# Patient Record
Sex: MALE | Race: WHITE | Employment: FULL TIME | URBAN - METROPOLITAN AREA
[De-identification: names, ages, dates, MRNs, and addresses within clinical notes are randomized per-mention and may not be internally consistent; named-entity substitution may affect disease eponyms.]

---

## 2018-06-18 ENCOUNTER — OFFICE VISIT (OUTPATIENT)
Dept: FAMILY MEDICINE CLINIC | Age: 23
End: 2018-06-18
Payer: COMMERCIAL

## 2018-06-18 VITALS
SYSTOLIC BLOOD PRESSURE: 124 MMHG | TEMPERATURE: 97.5 F | WEIGHT: 189.4 LBS | BODY MASS INDEX: 25.65 KG/M2 | RESPIRATION RATE: 16 BRPM | HEIGHT: 72 IN | HEART RATE: 72 BPM | DIASTOLIC BLOOD PRESSURE: 72 MMHG

## 2018-06-18 DIAGNOSIS — Z00.00 HEALTHCARE MAINTENANCE: Primary | ICD-10-CM

## 2018-06-18 DIAGNOSIS — K21.9 GASTROESOPHAGEAL REFLUX DISEASE, ESOPHAGITIS PRESENCE NOT SPECIFIED: ICD-10-CM

## 2018-06-18 DIAGNOSIS — Z00.00 HEALTHCARE MAINTENANCE: ICD-10-CM

## 2018-06-18 LAB
ALBUMIN SERPL-MCNC: 4.5 G/DL (ref 3.9–4.9)
ALP BLD-CCNC: 53 U/L (ref 35–104)
ALT SERPL-CCNC: 19 U/L (ref 0–41)
ANION GAP SERPL CALCULATED.3IONS-SCNC: 14 MEQ/L (ref 7–13)
AST SERPL-CCNC: 20 U/L (ref 0–40)
BASOPHILS ABSOLUTE: 0.1 K/UL (ref 0–0.2)
BASOPHILS RELATIVE PERCENT: 0.9 %
BILIRUB SERPL-MCNC: 0.4 MG/DL (ref 0–1.2)
BUN BLDV-MCNC: 15 MG/DL (ref 6–20)
CALCIUM SERPL-MCNC: 9.4 MG/DL (ref 8.6–10.2)
CHLORIDE BLD-SCNC: 101 MEQ/L (ref 98–107)
CO2: 26 MEQ/L (ref 22–29)
CREAT SERPL-MCNC: 0.64 MG/DL (ref 0.7–1.2)
EOSINOPHILS ABSOLUTE: 0.1 K/UL (ref 0–0.7)
EOSINOPHILS RELATIVE PERCENT: 1.8 %
GFR AFRICAN AMERICAN: >60
GFR NON-AFRICAN AMERICAN: >60
GLOBULIN: 2.7 G/DL (ref 2.3–3.5)
GLUCOSE BLD-MCNC: 107 MG/DL (ref 74–109)
HCT VFR BLD CALC: 41.5 % (ref 42–52)
HEMOGLOBIN: 14.1 G/DL (ref 14–18)
LYMPHOCYTES ABSOLUTE: 2.3 K/UL (ref 1–4.8)
LYMPHOCYTES RELATIVE PERCENT: 36.8 %
MCH RBC QN AUTO: 29.8 PG (ref 27–31.3)
MCHC RBC AUTO-ENTMCNC: 34 % (ref 33–37)
MCV RBC AUTO: 87.9 FL (ref 80–100)
MONOCYTES ABSOLUTE: 0.6 K/UL (ref 0.2–0.8)
MONOCYTES RELATIVE PERCENT: 9.4 %
NEUTROPHILS ABSOLUTE: 3.1 K/UL (ref 1.4–6.5)
NEUTROPHILS RELATIVE PERCENT: 51.1 %
PDW BLD-RTO: 12.5 % (ref 11.5–14.5)
PLATELET # BLD: 235 K/UL (ref 130–400)
POTASSIUM SERPL-SCNC: 4.2 MEQ/L (ref 3.5–5.1)
RBC # BLD: 4.72 M/UL (ref 4.7–6.1)
SODIUM BLD-SCNC: 141 MEQ/L (ref 132–144)
TOTAL PROTEIN: 7.2 G/DL (ref 6.4–8.1)
WBC # BLD: 6.1 K/UL (ref 4.8–10.8)

## 2018-06-18 PROCEDURE — 99395 PREV VISIT EST AGE 18-39: CPT | Performed by: FAMILY MEDICINE

## 2018-06-18 RX ORDER — OMEPRAZOLE 20 MG/1
20 CAPSULE, DELAYED RELEASE ORAL DAILY
Qty: 30 CAPSULE | Refills: 1 | Status: SHIPPED | OUTPATIENT
Start: 2018-06-18 | End: 2018-08-24 | Stop reason: SDUPTHER

## 2018-06-18 RX ORDER — OMEPRAZOLE 20 MG/1
20 CAPSULE, DELAYED RELEASE ORAL DAILY
Qty: 90 CAPSULE | Refills: 3 | OUTPATIENT
Start: 2018-06-18

## 2018-06-18 RX ORDER — OMEPRAZOLE 20 MG/1
20 CAPSULE, DELAYED RELEASE ORAL 2 TIMES DAILY
Qty: 30 CAPSULE | Refills: 2 | Status: SHIPPED | OUTPATIENT
Start: 2018-06-18 | End: 2018-06-18 | Stop reason: SDUPTHER

## 2018-06-18 RX ORDER — CALCIUM CARBONATE 200(500)MG
2 TABLET,CHEWABLE ORAL PRN
COMMUNITY
End: 2018-08-24

## 2018-06-18 ASSESSMENT — PATIENT HEALTH QUESTIONNAIRE - PHQ9
SUM OF ALL RESPONSES TO PHQ9 QUESTIONS 1 & 2: 0
1. LITTLE INTEREST OR PLEASURE IN DOING THINGS: 0
SUM OF ALL RESPONSES TO PHQ QUESTIONS 1-9: 0
2. FEELING DOWN, DEPRESSED OR HOPELESS: 0

## 2018-08-24 ENCOUNTER — OFFICE VISIT (OUTPATIENT)
Dept: FAMILY MEDICINE CLINIC | Age: 23
End: 2018-08-24
Payer: COMMERCIAL

## 2018-08-24 VITALS
SYSTOLIC BLOOD PRESSURE: 104 MMHG | TEMPERATURE: 97.4 F | RESPIRATION RATE: 12 BRPM | HEIGHT: 72 IN | BODY MASS INDEX: 25.33 KG/M2 | HEART RATE: 60 BPM | WEIGHT: 187 LBS | DIASTOLIC BLOOD PRESSURE: 62 MMHG

## 2018-08-24 DIAGNOSIS — K21.9 GASTROESOPHAGEAL REFLUX DISEASE, ESOPHAGITIS PRESENCE NOT SPECIFIED: ICD-10-CM

## 2018-08-24 DIAGNOSIS — J01.90 ACUTE BACTERIAL SINUSITIS: Primary | ICD-10-CM

## 2018-08-24 DIAGNOSIS — B96.89 ACUTE BACTERIAL SINUSITIS: Primary | ICD-10-CM

## 2018-08-24 PROCEDURE — G8419 CALC BMI OUT NRM PARAM NOF/U: HCPCS | Performed by: FAMILY MEDICINE

## 2018-08-24 PROCEDURE — 1036F TOBACCO NON-USER: CPT | Performed by: FAMILY MEDICINE

## 2018-08-24 PROCEDURE — 99213 OFFICE O/P EST LOW 20 MIN: CPT | Performed by: FAMILY MEDICINE

## 2018-08-24 PROCEDURE — G8427 DOCREV CUR MEDS BY ELIG CLIN: HCPCS | Performed by: FAMILY MEDICINE

## 2018-08-24 RX ORDER — FLUTICASONE PROPIONATE 50 MCG
SPRAY, SUSPENSION (ML) NASAL
Qty: 1 BOTTLE | Refills: 2 | Status: SHIPPED | OUTPATIENT
Start: 2018-08-24 | End: 2018-10-17 | Stop reason: ALTCHOICE

## 2018-08-24 RX ORDER — BENZONATATE 200 MG/1
200 CAPSULE ORAL 3 TIMES DAILY PRN
Qty: 30 CAPSULE | Refills: 0 | Status: SHIPPED | OUTPATIENT
Start: 2018-08-24 | End: 2018-10-17 | Stop reason: ALTCHOICE

## 2018-08-24 RX ORDER — OMEPRAZOLE 20 MG/1
20 CAPSULE, DELAYED RELEASE ORAL DAILY
Qty: 30 CAPSULE | Refills: 5 | Status: SHIPPED | OUTPATIENT
Start: 2018-08-24 | End: 2018-08-24 | Stop reason: SDUPTHER

## 2018-08-24 RX ORDER — AZITHROMYCIN 250 MG/1
TABLET, FILM COATED ORAL
Qty: 1 PACKET | Refills: 0 | Status: SHIPPED | OUTPATIENT
Start: 2018-08-24 | End: 2018-10-17 | Stop reason: ALTCHOICE

## 2018-08-24 RX ORDER — OMEPRAZOLE 20 MG/1
20 CAPSULE, DELAYED RELEASE ORAL DAILY
Qty: 90 CAPSULE | Refills: 1 | Status: SHIPPED | OUTPATIENT
Start: 2018-08-24 | End: 2019-03-16 | Stop reason: SDUPTHER

## 2018-08-24 NOTE — PROGRESS NOTES
Chief Complaint   Patient presents with    Follow-up     GERD    URI     cough, congestion, sore throat, headache       Patient presents today to follow up on GERD. He has been doing well on the omeprazole which she takes only every other day. He has been tolerating the medication with no side effects. His reflux symptoms has resolved. He denies heartburn no dysphagia and no odynophagia. He has no diarrhea and no other side effects from the omeprazole. Thomas Page is a 25 y.o. male who presents for evaluation of symptoms of a URI, possible sinusitis. Symptoms include congestion, headache described as frontal, nasal congestion, post nasal drip, productive cough with  yellow and green colored sputum and sore throat. Onset of symptoms was 47days ago, unchanged since that time. Treatment to date: ibuprofen  . Patient Active Problem List   Diagnosis    Gastroesophageal reflux disease       Current Outpatient Prescriptions   Medication Sig Dispense Refill    azithromycin (ZITHROMAX) 250 MG tablet Take 2 tabs (500 mg) on Day 1, and take 1 tab (250 mg) on days 2 through 5. 1 packet 0    benzonatate (TESSALON) 200 MG capsule Take 1 capsule by mouth 3 times daily as needed for Cough 30 capsule 0    fluticasone (FLONASE) 50 MCG/ACT nasal spray 2 sprays each nostril daily 1 Bottle 2    omeprazole (PRILOSEC) 20 MG delayed release capsule Take 1 capsule by mouth Daily 90 capsule 1     No current facility-administered medications for this visit.         Review of Systems   General ROS: negative for - chills, fever, malaise or night sweats   Respiratory ROS: negative for - hemoptysis, pleuritic pain, shortness of breath or wheezing  Cardiovascular ROS: negative for - chest pain, dyspnea on exertion, edema, loss of consciousness, orthopnea, palpitations or paroxysmal nocturnal dyspnea   Gastrointestinal ROS: negative for - abdominal pain, blood in stools, change in bowel habits, constipation, diarrhea,

## 2018-08-24 NOTE — PATIENT INSTRUCTIONS
Patient Education        Sinusitis: Care Instructions  Your Care Instructions    Sinusitis is an infection of the lining of the sinus cavities in your head. Sinusitis often follows a cold. It causes pain and pressure in your head and face. In most cases, sinusitis gets better on its own in 1 to 2 weeks. But some mild symptoms may last for several weeks. Sometimes antibiotics are needed. Follow-up care is a key part of your treatment and safety. Be sure to make and go to all appointments, and call your doctor if you are having problems. It's also a good idea to know your test results and keep a list of the medicines you take. How can you care for yourself at home? · Take an over-the-counter pain medicine, such as acetaminophen (Tylenol), ibuprofen (Advil, Motrin), or naproxen (Aleve). Read and follow all instructions on the label. · If the doctor prescribed antibiotics, take them as directed. Do not stop taking them just because you feel better. You need to take the full course of antibiotics. · Be careful when taking over-the-counter cold or flu medicines and Tylenol at the same time. Many of these medicines have acetaminophen, which is Tylenol. Read the labels to make sure that you are not taking more than the recommended dose. Too much acetaminophen (Tylenol) can be harmful. · Breathe warm, moist air from a steamy shower, a hot bath, or a sink filled with hot water. Avoid cold, dry air. Using a humidifier in your home may help. Follow the directions for cleaning the machine. · Use saline (saltwater) nasal washes to help keep your nasal passages open and wash out mucus and bacteria. You can buy saline nose drops at a grocery store or drugstore. Or you can make your own at home by adding 1 teaspoon of salt and 1 teaspoon of baking soda to 2 cups of distilled water. If you make your own, fill a bulb syringe with the solution, insert the tip into your nostril, and squeeze gently. Genaro Edilma your nose.   · Put a hot, wet towel or a warm gel pack on your face 3 or 4 times a day for 5 to 10 minutes each time. · Try a decongestant nasal spray like oxymetazoline (Afrin). Do not use it for more than 3 days in a row. Using it for more than 3 days can make your congestion worse. When should you call for help? Call your doctor now or seek immediate medical care if:    · You have new or worse swelling or redness in your face or around your eyes.     · You have a new or higher fever.    Watch closely for changes in your health, and be sure to contact your doctor if:    · You have new or worse facial pain.     · The mucus from your nose becomes thicker (like pus) or has new blood in it.     · You are not getting better as expected. Where can you learn more? Go to https://SweetSlappeJetaporteb.Cloud.CM. org and sign in to your [employer] account. Enter P652 in the [facility] box to learn more about \"Sinusitis: Care Instructions. \"     If you do not have an account, please click on the \"Sign Up Now\" link. Current as of: May 12, 2017  Content Version: 11.7  © 4038-9310 [employer]. Care instructions adapted under license by [facility] ([facility]). If you have questions about a medical condition or this instruction, always ask your healthcare professional. [place] [address] any warranty or liability for your use of this information. Patient Education        Gastroesophageal Reflux Disease (GERD): Care Instructions  Your Care Instructions    Gastroesophageal reflux disease (GERD) is the backward flow of stomach acid into the esophagus. The esophagus is the tube that leads from your throat to your stomach. A one-way valve prevents the stomach acid from moving up into this tube. When you have GERD, this valve does not close tightly enough. If you have mild GERD symptoms including heartburn, you may be able to control the problem with antacids or over-the-counter medicine.  Changing your diet, losing weight, and making other lifestyle changes can also help reduce symptoms. Follow-up care is a key part of your treatment and safety. Be sure to make and go to all appointments, and call your doctor if you are having problems. It's also a good idea to know your test results and keep a list of the medicines you take. How can you care for yourself at home? · Take your medicines exactly as prescribed. Call your doctor if you think you are having a problem with your medicine. · Your doctor may recommend over-the-counter medicine. For mild or occasional indigestion, antacids, such as Tums, Gaviscon, Mylanta, or Maalox, may help. Your doctor also may recommend over-the-counter acid reducers, such as Pepcid AC, Tagamet HB, Zantac 75, or Prilosec. Read and follow all instructions on the label. If you use these medicines often, talk with your doctor. · Change your eating habits. ¨ It's best to eat several small meals instead of two or three large meals. ¨ After you eat, wait 2 to 3 hours before you lie down. ¨ Chocolate, mint, and alcohol can make GERD worse. ¨ Spicy foods, foods that have a lot of acid (like tomatoes and oranges), and coffee can make GERD symptoms worse in some people. If your symptoms are worse after you eat a certain food, you may want to stop eating that food to see if your symptoms get better. · Do not smoke or chew tobacco. Smoking can make GERD worse. If you need help quitting, talk to your doctor about stop-smoking programs and medicines. These can increase your chances of quitting for good. · If you have GERD symptoms at night, raise the head of your bed 6 to 8 inches by putting the frame on blocks or placing a foam wedge under the head of your mattress. (Adding extra pillows does not work.)  · Do not wear tight clothing around your middle. · Lose weight if you need to. Losing just 5 to 10 pounds can help. When should you call for help?   Call your doctor now or seek immediate medical

## 2018-08-28 ENCOUNTER — TELEPHONE (OUTPATIENT)
Dept: FAMILY MEDICINE CLINIC | Age: 23
End: 2018-08-28

## 2018-08-28 NOTE — LETTER
[address] [place] [address]  Phone: [phone]  Fax: [phone]    Elliot Riley MD        August 28, 2018     Patient: Ronn Coffey   YOB: 1995   Date of Visit: 8/24/2018       To Whom it May Concern:    Oz Dolan was seen in my clinic on 08/24/2018. He is to be excused from work 8/28/18. If you have any questions or concerns, please don't hesitate to call.     Sincerely,         Elliot Riley MD

## 2018-08-29 ENCOUNTER — OFFICE VISIT (OUTPATIENT)
Dept: FAMILY MEDICINE CLINIC | Age: 23
End: 2018-08-29
Payer: COMMERCIAL

## 2018-08-29 VITALS
WEIGHT: 186 LBS | HEART RATE: 51 BPM | RESPIRATION RATE: 14 BRPM | SYSTOLIC BLOOD PRESSURE: 102 MMHG | OXYGEN SATURATION: 99 % | HEIGHT: 72 IN | TEMPERATURE: 97.9 F | DIASTOLIC BLOOD PRESSURE: 62 MMHG | BODY MASS INDEX: 25.19 KG/M2

## 2018-08-29 DIAGNOSIS — H61.21 RIGHT EAR IMPACTED CERUMEN: ICD-10-CM

## 2018-08-29 DIAGNOSIS — J01.90 ACUTE BACTERIAL SINUSITIS: Primary | ICD-10-CM

## 2018-08-29 DIAGNOSIS — B96.89 ACUTE BACTERIAL SINUSITIS: Primary | ICD-10-CM

## 2018-08-29 PROCEDURE — 99213 OFFICE O/P EST LOW 20 MIN: CPT | Performed by: NURSE PRACTITIONER

## 2018-08-29 PROCEDURE — G8419 CALC BMI OUT NRM PARAM NOF/U: HCPCS | Performed by: NURSE PRACTITIONER

## 2018-08-29 PROCEDURE — G8427 DOCREV CUR MEDS BY ELIG CLIN: HCPCS | Performed by: NURSE PRACTITIONER

## 2018-08-29 PROCEDURE — 1036F TOBACCO NON-USER: CPT | Performed by: NURSE PRACTITIONER

## 2018-08-29 RX ORDER — AMOXICILLIN AND CLAVULANATE POTASSIUM 875; 125 MG/1; MG/1
1 TABLET, FILM COATED ORAL 2 TIMES DAILY
Qty: 20 TABLET | Refills: 0 | Status: SHIPPED | OUTPATIENT
Start: 2018-08-29 | End: 2018-09-08

## 2018-08-29 RX ORDER — AMOXICILLIN AND CLAVULANATE POTASSIUM 875; 125 MG/1; MG/1
1 TABLET, FILM COATED ORAL 2 TIMES DAILY
Qty: 20 TABLET | Refills: 0 | Status: SHIPPED | OUTPATIENT
Start: 2018-08-29 | End: 2018-08-29 | Stop reason: SDUPTHER

## 2018-08-29 ASSESSMENT — ENCOUNTER SYMPTOMS
WHEEZING: 0
RHINORRHEA: 1
SORE THROAT: 1
DIARRHEA: 0
EYE DISCHARGE: 0
COUGH: 1
NAUSEA: 0
VOMITING: 0
EYE REDNESS: 0
SINUS PAIN: 1
SINUS PRESSURE: 1
EYE PAIN: 0
CONSTIPATION: 0
TROUBLE SWALLOWING: 0
SHORTNESS OF BREATH: 1
EYE ITCHING: 0
CHEST TIGHTNESS: 0

## 2018-08-29 NOTE — PROGRESS NOTES
Subjective  Office Visit  [address] [facility]  [place] [address]  Dept: [phone]  Dept Fax: [phone]  Loc: [address]  YOB: 1995  Age: 25 y.o. Sex: male  Date of Assessment:  8/29/2018  PCP: Jerzy Uriostegui MD    Chief Complaint   Patient presents with    Cough     was seen 08/24/2018 not getting better, vomiting this morning taking medication with food     Congestion       HPI      Complains of: Vomiting twice today. Harsh cough with yellow and occasional pink tinged mucous production and causing a sore throat, nasal congestion which at times seems to be causing some shortness of breath because increased sinus pain    Symptoms started: Friday    Denies symptoms of: N/D/C, chest pain, fever, chills. Symptoms are: rapidly worsening since that time. Treatment to date: azithromycin, which has been not affective    Relieving factors: ibuprofen    Aggravating factors: nothing    Personal and Family History    Past Medical History:   Diagnosis Date    Acne vulgaris     Hemoptysis        History reviewed. No pertinent surgical history. Family History   Problem Relation Age of Onset    High Blood Pressure Father        Social History    Social History     Social History    Marital status: Single     Spouse name: N/A    Number of children: N/A    Years of education: N/A     Occupational History    Not on file.      Social History Main Topics    Smoking status: Never Smoker    Smokeless tobacco: Never Used      Comment: NON SMOKING HOUSEHOLD    Alcohol use No    Drug use: No    Sexual activity: Yes     Other Topics Concern    Not on file     Social History Narrative    No narrative on file       Medication    Current Outpatient Prescriptions on File Prior to Visit   Medication Sig Dispense Refill    azithromycin (ZITHROMAX) 250 MG tablet Take 2 tabs (500 mg) on Day 1, and take 1 tab (250 mg) on days 2 through 5. 1 packet 0    benzonatate (TESSALON) 200 MG capsule Take 1 capsule by mouth 3 times daily as needed for Cough 30 capsule 0    omeprazole (PRILOSEC) 20 MG delayed release capsule Take 1 capsule by mouth Daily 90 capsule 1    fluticasone (FLONASE) 50 MCG/ACT nasal spray 2 sprays each nostril daily 1 Bottle 2     No current facility-administered medications on file prior to visit. Allergies    Patient has no known allergies. ROS    Review of Systems   Constitutional: Negative for activity change, appetite change, chills, diaphoresis, fatigue and fever. HENT: Positive for congestion, postnasal drip, rhinorrhea, sinus pain, sinus pressure and sore throat. Negative for drooling, ear discharge, ear pain, hearing loss, sneezing and trouble swallowing. Eyes: Negative for pain, discharge, redness and itching. Respiratory: Positive for cough and shortness of breath. Negative for chest tightness and wheezing. Cardiovascular: Negative for chest pain and palpitations. Gastrointestinal: Negative for constipation, diarrhea, nausea and vomiting. Allergic/Immunologic: Negative for environmental allergies and food allergies. Vitals    /62 (Site: Right Arm, Position: Sitting, Cuff Size: Large Adult)   Pulse 51   Temp 97.9 °F (36.6 °C) (Temporal)   Resp 14   Ht 5' 11.5\" (1.816 m)   Wt 186 lb (84.4 kg)   SpO2 99%   BMI 25.58 kg/m²     BP Readings from Last 3 Encounters:   08/29/18 102/62   08/24/18 104/62   06/18/18 124/72         Wt Readings from Last 3 Encounters:   08/29/18 186 lb (84.4 kg)   08/24/18 187 lb (84.8 kg)   06/18/18 189 lb 6.4 oz (85.9 kg)       Objective    Physical Exam   Constitutional: He is oriented to person, place, and time. Vital signs are normal. He appears well-developed and well-nourished. HENT:   Head: Normocephalic.    Right Ear: Hearing, external ear and ear canal normal.   Left Ear: Hearing, tympanic membrane, external ear and ear canal normal.   Nose: Mucosal edema and rhinorrhea present. Right sinus exhibits frontal sinus tenderness. Right sinus exhibits no maxillary sinus tenderness. Left sinus exhibits frontal sinus tenderness. Left sinus exhibits no maxillary sinus tenderness. Mouth/Throat: Uvula is midline and mucous membranes are normal. Posterior oropharyngeal erythema present. Right ear cerumen occulusion no hearing loss   Eyes: Conjunctivae and EOM are normal.   Neck: Normal range of motion. Neck supple. Cardiovascular: Normal rate and regular rhythm. Pulmonary/Chest: Effort normal and breath sounds normal.   Abdominal: Soft. Normal appearance. Musculoskeletal: Normal range of motion. Lymphadenopathy:        Head (right side): No submental, no submandibular, no tonsillar, no preauricular, no posterior auricular and no occipital adenopathy present. Head (left side): No submental, no submandibular, no tonsillar, no preauricular, no posterior auricular and no occipital adenopathy present. He has cervical adenopathy. Right cervical: Superficial cervical adenopathy present. Left cervical: Superficial cervical adenopathy present. Right: No supraclavicular adenopathy present. Left: No supraclavicular adenopathy present. Neurological: He is alert and oriented to person, place, and time. Skin: Skin is warm, dry and intact. Psychiatric: He has a normal mood and affect. His speech is normal and behavior is normal. Judgment and thought content normal. Cognition and memory are normal.   Nursing note and vitals reviewed. Assessment and Treatment     Diagnosis Orders   1. Acute bacterial sinusitis  amoxicillin-clavulanate (AUGMENTIN) 875-125 MG per tablet    DISCONTINUED: amoxicillin-clavulanate (AUGMENTIN) 875-125 MG per tablet   2. Right ear impacted cerumen         Plan and Follow Up    No orders of the defined types were placed in this encounter.       Orders Placed This Encounter Medications    DISCONTD: amoxicillin-clavulanate (AUGMENTIN) 875-125 MG per tablet     Sig: Take 1 tablet by mouth 2 times daily for 10 days     Dispense:  20 tablet     Refill:  0    amoxicillin-clavulanate (AUGMENTIN) 875-125 MG per tablet     Sig: Take 1 tablet by mouth 2 times daily for 10 days     Dispense:  20 tablet     Refill:  0     OTC wax removal product discussed if not successful return to office for cleaning. Discussed how to take antibiotic and when to follow up. Increase water intake. Discussed bronchitis course of bronchitis and when to follow up. Advised patient to take OTC delsym to help with cough as Tessalon Perles do not seem to be helping much. No Follow-up on file. Reviewed with the patient: current clinical status, medications, activities and diet. Side effects, adverse effects of the medication prescribed today, as well as treatment plan/ rationale and result expectations have been discussed with the patient who expresses understanding and desires to proceed. Close follow up to evaluate treatment results and for coordination of care. I have reviewed the patient's medical history in detail and updated the computerized patient record. Matilda Dockery, APRN - CNP        No future appointments.

## 2018-10-17 ENCOUNTER — TELEPHONE (OUTPATIENT)
Dept: FAMILY MEDICINE CLINIC | Age: 23
End: 2018-10-17

## 2018-10-17 ENCOUNTER — OFFICE VISIT (OUTPATIENT)
Dept: FAMILY MEDICINE CLINIC | Age: 23
End: 2018-10-17
Payer: COMMERCIAL

## 2018-10-17 VITALS
RESPIRATION RATE: 12 BRPM | BODY MASS INDEX: 25.47 KG/M2 | WEIGHT: 188 LBS | SYSTOLIC BLOOD PRESSURE: 116 MMHG | HEIGHT: 72 IN | HEART RATE: 64 BPM | TEMPERATURE: 98.9 F | DIASTOLIC BLOOD PRESSURE: 64 MMHG

## 2018-10-17 DIAGNOSIS — R53.83 OTHER FATIGUE: ICD-10-CM

## 2018-10-17 DIAGNOSIS — F32.1 CURRENT MODERATE EPISODE OF MAJOR DEPRESSIVE DISORDER WITHOUT PRIOR EPISODE (HCC): Primary | ICD-10-CM

## 2018-10-17 DIAGNOSIS — Z13.31 POSITIVE DEPRESSION SCREENING: ICD-10-CM

## 2018-10-17 DIAGNOSIS — K21.9 GASTROESOPHAGEAL REFLUX DISEASE, ESOPHAGITIS PRESENCE NOT SPECIFIED: ICD-10-CM

## 2018-10-17 PROCEDURE — G8427 DOCREV CUR MEDS BY ELIG CLIN: HCPCS | Performed by: FAMILY MEDICINE

## 2018-10-17 PROCEDURE — G8431 POS CLIN DEPRES SCRN F/U DOC: HCPCS | Performed by: FAMILY MEDICINE

## 2018-10-17 PROCEDURE — G0444 DEPRESSION SCREEN ANNUAL: HCPCS | Performed by: FAMILY MEDICINE

## 2018-10-17 PROCEDURE — G8419 CALC BMI OUT NRM PARAM NOF/U: HCPCS | Performed by: FAMILY MEDICINE

## 2018-10-17 PROCEDURE — 99214 OFFICE O/P EST MOD 30 MIN: CPT | Performed by: FAMILY MEDICINE

## 2018-10-17 PROCEDURE — G8484 FLU IMMUNIZE NO ADMIN: HCPCS | Performed by: FAMILY MEDICINE

## 2018-10-17 PROCEDURE — 1036F TOBACCO NON-USER: CPT | Performed by: FAMILY MEDICINE

## 2018-10-17 RX ORDER — BUPROPION HYDROCHLORIDE 150 MG/1
150 TABLET ORAL EVERY MORNING
Qty: 30 TABLET | Refills: 1 | Status: SHIPPED | OUTPATIENT
Start: 2018-10-17 | End: 2018-12-15 | Stop reason: SDUPTHER

## 2018-10-17 ASSESSMENT — PATIENT HEALTH QUESTIONNAIRE - PHQ9
SUM OF ALL RESPONSES TO PHQ QUESTIONS 1-9: 15
10. IF YOU CHECKED OFF ANY PROBLEMS, HOW DIFFICULT HAVE THESE PROBLEMS MADE IT FOR YOU TO DO YOUR WORK, TAKE CARE OF THINGS AT HOME, OR GET ALONG WITH OTHER PEOPLE: 1
7. TROUBLE CONCENTRATING ON THINGS, SUCH AS READING THE NEWSPAPER OR WATCHING TELEVISION: 0
4. FEELING TIRED OR HAVING LITTLE ENERGY: 3
8. MOVING OR SPEAKING SO SLOWLY THAT OTHER PEOPLE COULD HAVE NOTICED. OR THE OPPOSITE, BEING SO FIGETY OR RESTLESS THAT YOU HAVE BEEN MOVING AROUND A LOT MORE THAN USUAL: 0
SUM OF ALL RESPONSES TO PHQ QUESTIONS 1-9: 15
9. THOUGHTS THAT YOU WOULD BE BETTER OFF DEAD, OR OF HURTING YOURSELF: 0
6. FEELING BAD ABOUT YOURSELF - OR THAT YOU ARE A FAILURE OR HAVE LET YOURSELF OR YOUR FAMILY DOWN: 3
3. TROUBLE FALLING OR STAYING ASLEEP: 3
5. POOR APPETITE OR OVEREATING: 0
SUM OF ALL RESPONSES TO PHQ9 QUESTIONS 1 & 2: 6
2. FEELING DOWN, DEPRESSED OR HOPELESS: 3
1. LITTLE INTEREST OR PLEASURE IN DOING THINGS: 3

## 2018-10-17 NOTE — PROGRESS NOTES
(WELLBUTRIN XL) 150 MG extended release tablet Take 1 tablet by mouth every morning 30 tablet 1    omeprazole (PRILOSEC) 20 MG delayed release capsule Take 1 capsule by mouth Daily 90 capsule 1    [DISCONTINUED] azithromycin (ZITHROMAX) 250 MG tablet Take 2 tabs (500 mg) on Day 1, and take 1 tab (250 mg) on days 2 through 5. 1 packet 0    [DISCONTINUED] benzonatate (TESSALON) 200 MG capsule Take 1 capsule by mouth 3 times daily as needed for Cough 30 capsule 0    [DISCONTINUED] fluticasone (FLONASE) 50 MCG/ACT nasal spray 2 sprays each nostril daily 1 Bottle 2     No facility-administered encounter medications on file as of 10/17/2018. Orders Placed This Encounter   Procedures    Ambulatory referral to Psychology     Referral Priority:   Routine     Referral Type:   Psychiatric     Referral Reason:   Specialty Services Required     Referred to Provider:   Amber L. Karyle Joe, PhD     Requested Specialty:   Psychology     Number of Visits Requested:   1    Positive Screen for Clinical Depression with a Documented Follow-up Plan         Recommended counseling. Handouts describing disease, natural history, and treatment were given to the patient. Reviewed concept of anxiety as biochemical imbalance of neurotransmitters and rationale for treatment. Instructed patient to contact office or on-call physician promptly should condition worsen or any new symptoms appear and provided on-call telephone numbers. IF THE PATIENT HAS ANY SUICIDAL OR HOMICIDAL IDEATIONS, CALL THE OFFICE, DISCUSS WITH A SUPPORT MEMBER, OR GO TO THE ER IMMEDIATELY. Patient was agreeable with this plan. Return in about 1 month (around 11/17/2018).

## 2018-11-30 ENCOUNTER — OFFICE VISIT (OUTPATIENT)
Dept: FAMILY MEDICINE CLINIC | Age: 23
End: 2018-11-30
Payer: COMMERCIAL

## 2018-11-30 VITALS
BODY MASS INDEX: 25.27 KG/M2 | OXYGEN SATURATION: 98 % | TEMPERATURE: 98.4 F | DIASTOLIC BLOOD PRESSURE: 88 MMHG | HEIGHT: 72 IN | SYSTOLIC BLOOD PRESSURE: 122 MMHG | RESPIRATION RATE: 16 BRPM | WEIGHT: 186.6 LBS | HEART RATE: 67 BPM

## 2018-11-30 DIAGNOSIS — N52.2 DRUG-INDUCED ERECTILE DYSFUNCTION: ICD-10-CM

## 2018-11-30 DIAGNOSIS — F32.1 CURRENT MODERATE EPISODE OF MAJOR DEPRESSIVE DISORDER WITHOUT PRIOR EPISODE (HCC): Primary | ICD-10-CM

## 2018-11-30 PROCEDURE — 1036F TOBACCO NON-USER: CPT | Performed by: NURSE PRACTITIONER

## 2018-11-30 PROCEDURE — G8419 CALC BMI OUT NRM PARAM NOF/U: HCPCS | Performed by: NURSE PRACTITIONER

## 2018-11-30 PROCEDURE — G8484 FLU IMMUNIZE NO ADMIN: HCPCS | Performed by: NURSE PRACTITIONER

## 2018-11-30 PROCEDURE — G8427 DOCREV CUR MEDS BY ELIG CLIN: HCPCS | Performed by: NURSE PRACTITIONER

## 2018-11-30 PROCEDURE — 99213 OFFICE O/P EST LOW 20 MIN: CPT | Performed by: NURSE PRACTITIONER

## 2018-11-30 ASSESSMENT — ENCOUNTER SYMPTOMS
NAUSEA: 0
VOMITING: 0
DIARRHEA: 0
CONSTIPATION: 0

## 2018-11-30 NOTE — PROGRESS NOTES
issue since starting the medication   Allergic/Immunologic: Negative for environmental allergies and food allergies. Psychiatric/Behavioral: Negative for agitation, behavioral problems, confusion, decreased concentration, dysphoric mood, hallucinations, self-injury, sleep disturbance and suicidal ideas. The patient is not nervous/anxious and is not hyperactive. Vitals    /88 (Site: Left Upper Arm, Position: Sitting, Cuff Size: Medium Adult)   Pulse 67   Temp 98.4 °F (36.9 °C) (Temporal)   Resp 16   Ht 5' 11.5\" (1.816 m)   Wt 186 lb 9.6 oz (84.6 kg)   SpO2 98%   BMI 25.66 kg/m²     BP Readings from Last 3 Encounters:   11/30/18 122/88   10/17/18 116/64   08/29/18 102/62         Wt Readings from Last 3 Encounters:   11/30/18 186 lb 9.6 oz (84.6 kg)   10/17/18 188 lb (85.3 kg)   08/29/18 186 lb (84.4 kg)       Objective    Physical Exam   Constitutional: He is oriented to person, place, and time. Vital signs are normal. He appears well-developed and well-nourished. HENT:   Head: Normocephalic. Eyes: Conjunctivae and EOM are normal.   Neck: Normal range of motion. Cardiovascular: Normal rate and regular rhythm. Pulmonary/Chest: Effort normal and breath sounds normal.   Abdominal: Normal appearance. Musculoskeletal: Normal range of motion. Neurological: He is alert and oriented to person, place, and time. Skin: Skin is warm and dry. Psychiatric: He has a normal mood and affect. His speech is normal and behavior is normal. Judgment and thought content normal. Cognition and memory are normal.   Nursing note and vitals reviewed. Assessment and Treatment     Diagnosis Orders   1. Current moderate episode of major depressive disorder without prior episode ([facility] [address]     2. Drug-induced erectile dysfunction         Plan and Follow Up    No orders of the defined types were placed in this encounter. No orders of the defined types were placed in this encounter.     Advised to stop drinking, increase water intake, and try to find time to get at least a min of 6 hours or 8 would be best.  Advised that the cause could be from the medication however at this time the patient did not want to discuss any other options because he states his symptoms are being managed otherwise. He states he will try the life style changes first and call me in two weeks if there is no improvement he wanted to discuss options of PRN medication or would consider trying another medication though I did discuss that antidepressants can cause decreased libido. Patient will follow up. Return in about 2 weeks (around 12/14/2018). Reviewed with the patient: current clinical status, medications, activities and diet. Side effects, adverse effects of the medicationprescribed today, as well as treatment plan/ rationale and result expectations have been discussed with the patient who expresses understanding and desires to proceed. Close follow up to evaluate treatment resultsand for coordination of care. I have reviewed the patient's medical history in detail and updated the computerized patient record.     ALLIE Daugherty CNP      Future Appointments  Date Time Provider Gokul Edmond   12/14/2018 4:30 PM ALLIE Daugherty CNP [address]

## 2018-12-13 DIAGNOSIS — F32.1 CURRENT MODERATE EPISODE OF MAJOR DEPRESSIVE DISORDER WITHOUT PRIOR EPISODE (HCC): ICD-10-CM

## 2018-12-13 RX ORDER — BUPROPION HYDROCHLORIDE 150 MG/1
150 TABLET ORAL EVERY MORNING
Qty: 30 TABLET | Refills: 1 | OUTPATIENT
Start: 2018-12-13

## 2018-12-15 ENCOUNTER — OFFICE VISIT (OUTPATIENT)
Dept: FAMILY MEDICINE CLINIC | Age: 23
End: 2018-12-15
Payer: COMMERCIAL

## 2018-12-15 VITALS
BODY MASS INDEX: 25.33 KG/M2 | HEIGHT: 72 IN | RESPIRATION RATE: 16 BRPM | SYSTOLIC BLOOD PRESSURE: 120 MMHG | DIASTOLIC BLOOD PRESSURE: 70 MMHG | HEART RATE: 72 BPM | TEMPERATURE: 97.9 F | WEIGHT: 187 LBS

## 2018-12-15 DIAGNOSIS — K21.9 GASTROESOPHAGEAL REFLUX DISEASE, ESOPHAGITIS PRESENCE NOT SPECIFIED: Primary | ICD-10-CM

## 2018-12-15 DIAGNOSIS — F32.1 CURRENT MODERATE EPISODE OF MAJOR DEPRESSIVE DISORDER WITHOUT PRIOR EPISODE (HCC): ICD-10-CM

## 2018-12-15 PROCEDURE — G8419 CALC BMI OUT NRM PARAM NOF/U: HCPCS | Performed by: FAMILY MEDICINE

## 2018-12-15 PROCEDURE — 99213 OFFICE O/P EST LOW 20 MIN: CPT | Performed by: FAMILY MEDICINE

## 2018-12-15 PROCEDURE — 1036F TOBACCO NON-USER: CPT | Performed by: FAMILY MEDICINE

## 2018-12-15 PROCEDURE — G8484 FLU IMMUNIZE NO ADMIN: HCPCS | Performed by: FAMILY MEDICINE

## 2018-12-15 PROCEDURE — G8427 DOCREV CUR MEDS BY ELIG CLIN: HCPCS | Performed by: FAMILY MEDICINE

## 2018-12-15 RX ORDER — BUPROPION HYDROCHLORIDE 150 MG/1
150 TABLET ORAL EVERY MORNING
Qty: 90 TABLET | Refills: 0 | Status: SHIPPED | OUTPATIENT
Start: 2018-12-15 | End: 2019-02-11 | Stop reason: SDUPTHER

## 2019-02-11 DIAGNOSIS — F32.1 CURRENT MODERATE EPISODE OF MAJOR DEPRESSIVE DISORDER WITHOUT PRIOR EPISODE (HCC): ICD-10-CM

## 2019-02-11 RX ORDER — BUPROPION HYDROCHLORIDE 150 MG/1
150 TABLET ORAL EVERY MORNING
Qty: 90 TABLET | Refills: 0 | Status: SHIPPED | OUTPATIENT
Start: 2019-02-11

## 2019-03-16 ENCOUNTER — TELEPHONE (OUTPATIENT)
Dept: FAMILY MEDICINE CLINIC | Age: 24
End: 2019-03-16

## 2019-03-16 DIAGNOSIS — K21.9 GASTROESOPHAGEAL REFLUX DISEASE, ESOPHAGITIS PRESENCE NOT SPECIFIED: ICD-10-CM

## 2019-03-16 RX ORDER — OMEPRAZOLE 20 MG/1
20 CAPSULE, DELAYED RELEASE ORAL DAILY
Qty: 90 CAPSULE | Refills: 0 | Status: SHIPPED | OUTPATIENT
Start: 2019-03-16

## 2019-05-13 ENCOUNTER — TELEPHONE (OUTPATIENT)
Dept: FAMILY MEDICINE CLINIC | Age: 24
End: 2019-05-13

## 2019-05-13 ENCOUNTER — TELEPHONE (OUTPATIENT)
Dept: ENDOCRINOLOGY | Age: 24
End: 2019-05-13

## 2019-05-13 NOTE — TELEPHONE ENCOUNTER
Wife returned call states that pt and family are following Dr Niko Denton to [facility]. Pre wife request medical release forms mailed out for family.

## 2023-04-24 DIAGNOSIS — F41.1 GENERALIZED ANXIETY DISORDER: ICD-10-CM

## 2023-04-24 DIAGNOSIS — K21.9 GASTROESOPHAGEAL REFLUX DISEASE WITHOUT ESOPHAGITIS: ICD-10-CM

## 2023-04-25 RX ORDER — OMEPRAZOLE 20 MG/1
CAPSULE, DELAYED RELEASE ORAL
Qty: 90 CAPSULE | Refills: 0 | Status: SHIPPED | OUTPATIENT
Start: 2023-04-25 | End: 2023-08-26

## 2023-04-25 RX ORDER — BUPROPION HYDROCHLORIDE 300 MG/1
TABLET ORAL
Qty: 90 TABLET | Refills: 0 | Status: SHIPPED | OUTPATIENT
Start: 2023-04-25 | End: 2023-09-05 | Stop reason: SDUPTHER

## 2023-04-25 NOTE — TELEPHONE ENCOUNTER
Rx Refill Request     Name: Inocente Gautam  :  1995     Specific Pharmacy location:  [facility] REQUESTING  Date of last appointment: 2022  Date of next appointment:  Visit date not found   Best number to reach patient:  [phone]       PATIENT DUE FOR FOLLOW UP IN

## 2023-08-21 DIAGNOSIS — F41.1 GENERALIZED ANXIETY DISORDER: ICD-10-CM

## 2023-08-21 NOTE — TELEPHONE ENCOUNTER
Rx Refill Request     Name: Inocente Quiñonesey  :  1995     Specific Pharmacy location:  [facility]  Date of last appointment:  2022  Date of next appointment:  Visit date not found   Best number to reach patient:  [phone]       PT NEEDS APPT FOR MEDICATIONS LAST SEEN OVER 1 YEAR PLEASE SCHEDULE PT

## 2023-08-24 NOTE — TELEPHONE ENCOUNTER
Attempted to leave message on vm unable to leave as phone did not ring on making outgoing call. Patient will be called again on 8/25/23 to attempt to schedule appointment

## 2023-08-26 ENCOUNTER — TELEPHONE (OUTPATIENT)
Dept: PRIMARY CARE | Facility: CLINIC | Age: 28
End: 2023-08-26
Payer: COMMERCIAL

## 2023-08-26 DIAGNOSIS — K21.9 GASTROESOPHAGEAL REFLUX DISEASE WITHOUT ESOPHAGITIS: ICD-10-CM

## 2023-08-26 RX ORDER — OMEPRAZOLE 20 MG/1
CAPSULE, DELAYED RELEASE ORAL
Qty: 90 CAPSULE | Refills: 0 | Status: SHIPPED | OUTPATIENT
Start: 2023-08-26 | End: 2023-09-20

## 2023-08-28 RX ORDER — BUPROPION HYDROCHLORIDE 300 MG/1
TABLET ORAL
Qty: 90 TABLET | Refills: 0 | OUTPATIENT
Start: 2023-08-28

## 2023-08-28 NOTE — TELEPHONE ENCOUNTER
Please deny medication refill, number in chart is non working and a letter as been sent out to patient

## 2023-08-28 NOTE — TELEPHONE ENCOUNTER
Patient scheduled virtual appointment due to his work schedule out of town. Scheduled appointment for 9/5/2023.

## 2023-08-28 NOTE — TELEPHONE ENCOUNTER
Unable to contact patient at the number on the chart 3rd attempt   At this time a letter will be sent to contact office to schedule an appointment with pcp for medication refills

## 2023-08-31 PROBLEM — I10 HYPERTENSION: Status: ACTIVE | Noted: 2023-08-31

## 2023-08-31 PROBLEM — F41.1 GENERALIZED ANXIETY DISORDER: Status: ACTIVE | Noted: 2023-08-31

## 2023-08-31 PROBLEM — K21.9 GERD (GASTROESOPHAGEAL REFLUX DISEASE): Status: ACTIVE | Noted: 2023-08-31

## 2023-08-31 PROBLEM — S29.9XXA INJURY OF CHEST WALL: Status: ACTIVE | Noted: 2023-08-31

## 2023-08-31 PROBLEM — F33.1 MODERATE EPISODE OF RECURRENT MAJOR DEPRESSIVE DISORDER (MULTI): Status: ACTIVE | Noted: 2023-08-31

## 2023-08-31 PROBLEM — R07.81 RIB PAIN: Status: ACTIVE | Noted: 2023-08-31

## 2023-08-31 PROBLEM — S20.212A CONTUSION OF LEFT CHEST WALL: Status: ACTIVE | Noted: 2023-08-31

## 2023-08-31 RX ORDER — HYDROCHLOROTHIAZIDE 25 MG/1
1 TABLET ORAL DAILY
COMMUNITY
Start: 2022-09-19 | End: 2023-09-05 | Stop reason: WASHOUT

## 2023-08-31 RX ORDER — IBUPROFEN 800 MG/1
1 TABLET ORAL EVERY 8 HOURS PRN
COMMUNITY
Start: 2019-12-30

## 2023-08-31 RX ORDER — PREDNISONE 20 MG/1
60 TABLET ORAL DAILY
COMMUNITY
Start: 2023-07-09 | End: 2023-07-14

## 2023-08-31 RX ORDER — AMLODIPINE BESYLATE 10 MG/1
10 TABLET ORAL DAILY
COMMUNITY
Start: 2023-07-23 | End: 2024-04-24 | Stop reason: ALTCHOICE

## 2023-08-31 RX ORDER — CEPHALEXIN 500 MG/1
CAPSULE ORAL
COMMUNITY
Start: 2023-07-09 | End: 2023-09-05 | Stop reason: WASHOUT

## 2023-08-31 RX ORDER — TADALAFIL 10 MG/1
TABLET ORAL
COMMUNITY
Start: 2023-07-19 | End: 2023-09-05 | Stop reason: WASHOUT

## 2023-08-31 RX ORDER — LOSARTAN POTASSIUM 100 MG/1
100 TABLET ORAL DAILY
COMMUNITY

## 2023-08-31 RX ORDER — SILDENAFIL 50 MG/1
TABLET, FILM COATED ORAL
COMMUNITY
Start: 2022-11-08 | End: 2023-09-05 | Stop reason: WASHOUT

## 2023-09-05 ENCOUNTER — TELEMEDICINE (OUTPATIENT)
Dept: PRIMARY CARE | Facility: CLINIC | Age: 28
End: 2023-09-05
Payer: COMMERCIAL

## 2023-09-05 ENCOUNTER — TELEPHONE (OUTPATIENT)
Dept: PRIMARY CARE | Facility: CLINIC | Age: 28
End: 2023-09-05

## 2023-09-05 VITALS — WEIGHT: 205 LBS | HEIGHT: 71 IN | BODY MASS INDEX: 28.7 KG/M2

## 2023-09-05 DIAGNOSIS — F33.1 MODERATE EPISODE OF RECURRENT MAJOR DEPRESSIVE DISORDER (MULTI): ICD-10-CM

## 2023-09-05 DIAGNOSIS — Z00.00 HEALTHCARE MAINTENANCE: Primary | ICD-10-CM

## 2023-09-05 DIAGNOSIS — F41.1 GENERALIZED ANXIETY DISORDER: ICD-10-CM

## 2023-09-05 PROCEDURE — 99395 PREV VISIT EST AGE 18-39: CPT | Performed by: FAMILY MEDICINE

## 2023-09-05 RX ORDER — BUPROPION HYDROCHLORIDE 300 MG/1
300 TABLET ORAL DAILY
Qty: 90 TABLET | Refills: 3 | Status: SHIPPED | OUTPATIENT
Start: 2023-09-05

## 2023-09-05 ASSESSMENT — ANXIETY QUESTIONNAIRES
7. FEELING AFRAID AS IF SOMETHING AWFUL MIGHT HAPPEN: NOT AT ALL
5. BEING SO RESTLESS THAT IT IS HARD TO SIT STILL: NOT AT ALL
4. TROUBLE RELAXING: NOT AT ALL
GAD7 TOTAL SCORE: 3
2. NOT BEING ABLE TO STOP OR CONTROL WORRYING: NOT AT ALL
1. FEELING NERVOUS, ANXIOUS, OR ON EDGE: NEARLY EVERY DAY
6. BECOMING EASILY ANNOYED OR IRRITABLE: NOT AT ALL
IF YOU CHECKED OFF ANY PROBLEMS ON THIS QUESTIONNAIRE, HOW DIFFICULT HAVE THESE PROBLEMS MADE IT FOR YOU TO DO YOUR WORK, TAKE CARE OF THINGS AT HOME, OR GET ALONG WITH OTHER PEOPLE: NOT DIFFICULT AT ALL
3. WORRYING TOO MUCH ABOUT DIFFERENT THINGS: NOT AT ALL

## 2023-09-05 ASSESSMENT — PATIENT HEALTH QUESTIONNAIRE - PHQ9
2. FEELING DOWN, DEPRESSED OR HOPELESS: NOT AT ALL
1. LITTLE INTEREST OR PLEASURE IN DOING THINGS: NOT AT ALL
SUM OF ALL RESPONSES TO PHQ9 QUESTIONS 1 AND 2: 0

## 2023-09-05 NOTE — TELEPHONE ENCOUNTER
Rx Refill Request Telephone Encounter    Name:  Inocente Gautam  :  [id number]  Medication Name:  bupropion XL (Wellbutrin XL) 300 mg   Specific Pharmacy location:  [facility] in [place]  Date of last appointment:    Date of next appointment:  NA  Best number to reach patient:  [phone]

## 2023-09-05 NOTE — PROGRESS NOTES
Chief Complaint   Patient presents with    Annual Exam    Follow-up     Anxiety and Depression      He  presents for annual physical exam and other chronic medical conditions      He presents for follow up of anxiety disorder. He has the following anxiety symptoms: difficulty concentrating, racing thoughts, and sweating. Symptoms have been unchanged since that time. He denies current suicidal and homicidal ideation. Previous treatment includes Wellbutrin. He complains of the following medication side effects: none.    He  presents for follow up of depression. Current symptoms include anhedonia, depressed mood, and difficulty concentrating. Symptoms have been stable since that time. Patient denies feelings of worthlessness/guilt, hopelessness, hypersomnia, recurrent thoughts of death, suicidal attempt, suicidal thoughts with specific plan, and suicidal thoughts without plan. Previous treatment includes: medication. He complains of the following side effects from the treatment: none.    Patient Active Problem List   Diagnosis    Contusion of left chest wall    Generalized anxiety disorder    GERD (gastroesophageal reflux disease)    Hypertension    Injury of chest wall    Moderate episode of recurrent major depressive disorder (CMS/HCC)    Rib pain    Healthcare maintenance       has a current medication list which includes the following prescription(s): amlodipine, ibuprofen, losartan, omeprazole, and bupropion xl.    Past Medical History:   Diagnosis Date    Other specified health status     Non-smoker       History reviewed. No pertinent surgical history.    family history includes Diabetes in his father; Hypertension in his mother.    Social History     Socioeconomic History    Marital status:      Spouse name: Not on file    Number of children: Not on file    Years of education: Not on file    Highest education level: Not on file   Occupational History    Not on file   Tobacco Use    Smoking status: Never  "   Smokeless tobacco: Current     Types: Chew   Substance and Sexual Activity    Alcohol use: Yes    Drug use: Defer    Sexual activity: Not on file   Other Topics Concern    Not on file   Social History Narrative    Not on file     Social Determinants of Health     Financial Resource Strain: Not on file   Food Insecurity: Not on file   Transportation Needs: Not on file   Physical Activity: Not on file   Stress: Not on file   Social Connections: Not on file   Intimate Partner Violence: Not on file   Housing Stability: Not on file       No Known Allergies    Review of Systems -   General ROS: negative for - fatigue, fever, malaise, night sweats or weight loss  Eyes ROS no blurred vision, no double vision, no eye discharge and no eye redness.  ENT ROS: negative for - hearing change, nasal discharge, oral lesions, sinus pain, sore throat, tinnitus or vertigo  Respiratory ROS: negative for - cough, hemoptysis, pleuritic pain, shortness of breath or wheezing  Cardiovascular ROS: no chest pain or dyspnea on exertion, palpitations, PND or orthopnea.  No syncope.  Gastrointestinal ROS: no abdominal pain, change in bowel habits, or black or bloody stools  Genito-Urinary ROS: no dysuria, trouble voiding, or hematuria  Dermatological ROS: negative for - dry skin, lumps, pruritus or rash  Musculoskeletal ROS: negative for - joint pain, joint stiffness, joint swelling, muscle pain or muscular weakness  Neurological ROS: negative for - dizziness, gait disturbance, headaches, impaired coordination/balance, numbness/tingling, tremors or visual changes  Endocrine ROS: negative for - hot flashes, malaise/lethargy, palpitations, polydipsia/polyuria, skin changes, temperature intolerance   Hematological and Lymphatic ROS: negative for - bruising, night sweats or pallor    Height 1.803 m (5' 11\"), weight 93 kg (205 lb).      Problem List Items Addressed This Visit       Generalized anxiety disorder    Current Assessment & Plan     The " risks and benefits of my recommendations, as well as other treatment options were discussed with the patient today.    The side effects of the medications were discussed.  Advised not to take the medication with alcohol .  Exercise regularly and this can give you a sense of well being and help decrease feelings of anxiety.;  Get plenty of sleep. Sleep rests your brain as well as your body, and can improve your general sense of wellbeing as well as your mood.;  Avoid alcohol and drug abuse. It may seem that alcohol or drugs relax you. But in the long run they make anxiety worse and cause more problems.;  Avoid caffeine. Caffeine is found in coffee, tea, soft drinks and chocolate. Caffeine may increase your sense of anxiety because it stimulates your nervous system. Also avoid over-the-counter diet pills, and cough and cold medicines that contain a decongestant.;  Confront the things that have made you anxious in the past. Begin by just picturing yourself confronting these things. By doing this, you can get used to the idea of confronting the things that make you anxious before you actually do it. After you feel more comfortable picturing yourself confronting these things, you can begin to actually face them.;  If you feel yourself getting anxious, practice a relaxation technique or focus on a simple task, such as counting backward from 100 to 0.;  Although feelings of anxiety are scary, they won't hurt you. Label the level of your fear from 0 to 10 and keep track as it goes up and down. Notice that it doesn't stay at a very high level for more than a few seconds. When the fear comes, accept it. Wait and give it time to pass without running away from it.;  Take medications as prescribed.         Relevant Medications    buPROPion XL (Wellbutrin XL) 300 mg 24 hr tablet    Healthcare maintenance - Primary    Moderate episode of recurrent major depressive disorder (CMS/HCC)    Current Assessment & Plan     Chronic  Condition Documentation : Stable based on symptoms and exam.  Continue established treatment plan and follow-up at least yearly.             Outpatient Encounter Medications as of 9/5/2023   Medication Sig Dispense Refill    amLODIPine (Norvasc) 10 mg tablet Take 1 tablet (10 mg) by mouth once daily.      ibuprofen 800 mg tablet Take 1 tablet (800 mg) by mouth every 8 hours if needed.      losartan (Cozaar) 100 mg tablet Take 1 tablet (100 mg) by mouth once daily.      omeprazole (PriLOSEC) 20 mg DR capsule TAKE 1 CAPSULE BY MOUTH EVERY DAY IN THE MORNING BEFORE BREAKFAST 90 capsule 0    [DISCONTINUED] buPROPion XL (Wellbutrin XL) 300 mg 24 hr tablet TAKE 1 TABLET BY MOUTH EVERY DAY 90 tablet 0    buPROPion XL (Wellbutrin XL) 300 mg 24 hr tablet Take 1 tablet (300 mg) by mouth once daily. Do not crush, chew, or split. 90 tablet 3    [DISCONTINUED] cephalexin (Keflex) 500 mg capsule TAKE 1 CAPSULE BY MOUTH TWICE A DAY FOR 3 DAYS      [DISCONTINUED] hydroCHLOROthiazide (HYDRODiuril) 25 mg tablet Take 1 tablet (25 mg) by mouth once daily.      [DISCONTINUED] omeprazole (PriLOSEC) 20 mg DR capsule TAKE 1 CAPSULE BY MOUTH EVERY DAY IN THE MORNING BEFORE BREAKFAST 90 capsule 0    [DISCONTINUED] sildenafil (Viagra) 50 mg tablet take 1 tablet by mouth 1 hour before MAX 1 TAB/DAY      [DISCONTINUED] tadalafil (Cialis) 10 mg tablet TAKE ONE TABLET 2 HOURS PRIOR TO INTERCOURSE       No facility-administered encounter medications on file as of 9/5/2023.       The nature of cardiac risk has been fully discussed with this patient. I have made  aware of  LDL target goal given  cardiovascular risk analysis. I have discussed the appropriate diet. The need for lifelong compliance in order to reduce risk is stressed. A regular exercise program is recommended to help achieve and maintain normal body weight, fitness and improve lipid balance. A written copy of a low fat, low cholesterol diet has been given to the patient.    Patient  education provided. They understand and agree with this course of treatment. They will return with new or worsening symptoms. Patient instructed to remain current with appropriate annual health maintenance.     Scribe Attestation  By signing my name below, I, Sheri Calle   attest that this documentation has been prepared under the direction and in the presence of Andreas Catalan MD.

## 2023-09-20 DIAGNOSIS — K21.9 GASTROESOPHAGEAL REFLUX DISEASE WITHOUT ESOPHAGITIS: Primary | ICD-10-CM

## 2023-09-20 RX ORDER — OMEPRAZOLE 20 MG/1
CAPSULE, DELAYED RELEASE ORAL
Qty: 90 CAPSULE | Refills: 1 | Status: SHIPPED | OUTPATIENT
Start: 2023-09-20 | End: 2024-04-23

## 2023-10-19 ENCOUNTER — TELEMEDICINE (OUTPATIENT)
Dept: PRIMARY CARE | Facility: CLINIC | Age: 28
End: 2023-10-19
Payer: COMMERCIAL

## 2023-10-19 ENCOUNTER — TELEPHONE (OUTPATIENT)
Dept: PRIMARY CARE | Facility: CLINIC | Age: 28
End: 2023-10-19

## 2023-10-19 ENCOUNTER — DOCUMENTATION (OUTPATIENT)
Dept: PRIMARY CARE | Facility: CLINIC | Age: 28
End: 2023-10-19

## 2023-10-19 DIAGNOSIS — K21.9 GASTROESOPHAGEAL REFLUX DISEASE WITHOUT ESOPHAGITIS: ICD-10-CM

## 2023-10-19 DIAGNOSIS — R09.81 NASAL CONGESTION WITH RHINORRHEA: ICD-10-CM

## 2023-10-19 DIAGNOSIS — J06.9 VIRAL UPPER RESPIRATORY INFECTION: Primary | ICD-10-CM

## 2023-10-19 DIAGNOSIS — J34.89 NASAL CONGESTION WITH RHINORRHEA: ICD-10-CM

## 2023-10-19 DIAGNOSIS — J00 NASOPHARYNGITIS: ICD-10-CM

## 2023-10-19 PROCEDURE — 99212 OFFICE O/P EST SF 10 MIN: CPT | Performed by: NURSE PRACTITIONER

## 2023-10-19 RX ORDER — OMEPRAZOLE 20 MG/1
CAPSULE, DELAYED RELEASE ORAL
Qty: 90 CAPSULE | Refills: 2 | OUTPATIENT
Start: 2023-10-19

## 2023-10-19 RX ORDER — BROMPHENIRAMINE MALEATE, PSEUDOEPHEDRINE HYDROCHLORIDE, AND DEXTROMETHORPHAN HYDROBROMIDE 2; 30; 10 MG/5ML; MG/5ML; MG/5ML
5 SYRUP ORAL 4 TIMES DAILY PRN
Qty: 120 ML | Refills: 0 | Status: SHIPPED | OUTPATIENT
Start: 2023-10-19 | End: 2023-10-29

## 2023-10-19 NOTE — PROGRESS NOTES
Subjective   Patient ID: Inocente Gautam is a 27 y.o. male who presents for Cough and Nasal Congestion.      Patient's PCP is Andreas Catalan MD      COLD SYMPTOMS  At home Covid-19 test: NO  Symptoms:  Fever---101, Congestion, , Slight Headache, Slight Sinus pressure on forehead and under eyes, Nausea, Diarrhea, Vomiting---1x, Chills, Body aches, Fatigue,    Length of Symptoms: Today 10.19.2023   Denies: Runny nose, Post Nasal Drainage, Dry/Productive Cough, BILATERAL LEFT RIGHT Ear ache pressure or popping, Sore throat, SOB, Wheezing,  Sneezing,   Factors that effect symptoms: ibuprofen, tylenol    --Related Information--  Weight:  195 lb     -------------------------------   HPI     Review of Systems    Objective   There were no vitals taken for this visit.    Physical Exam    Assessment/Plan

## 2023-10-19 NOTE — PATIENT INSTRUCTIONS
DISCHARGE SUMMARY:   Diagnosis, treatment, treatment options, and possible complications of today's illness discussed and explained to patient. Patient to take medication/s associated with this visit. Patient may also take OTC analgesic/antipyretic if needed for pain/fever. Advised to increase oral fluid intake. Advised steam inhalation if needed to relieve congestion. Advised warm saline gargle if needed to relieve throat discomfort. Advised to come back if with worsening or persistent symptoms. Patient verbalized understanding of plan of care.    Patient to come back in 7 - 10 days if needed for worsening symptoms.

## 2023-10-19 NOTE — LETTER
October 19, 2023     Patient: Inocente Gautam   YOB: 1995   Date of Visit: 10/19/2023       To Whom It May Concern:    Inocente Gautam was seen in my clinic on 10/19/2023 at ?. Please excuse Inocente for his absence from work on this day to make the appointment.    If you have any questions or concerns, please don't hesitate to call.         Sincerely,   The Office of Rasheed Pedersen, CNP

## 2023-10-19 NOTE — PROGRESS NOTES
Subjective   Patient ID: Inocente Gautam is a 27 y.o. male who is with a chief complaint of symptoms of respiratory tract infection.     HPI  Patient is a 27 y.o. male who CONSULTED AT [facility] - PHONE ONLY today. Patient is with complaints of nasal congestion, nasal discharge, headache / sinus pain, fatigue, muscle ache, chills and fever. He denies sore throat, cough, loss of sense of taste, loss of sense of smell, nor diarrhea. Patient states that present condition started early this morning. Patient denies history of recent travel, exposure to person/people who tested positive for COVID 19, nor exposure to person/people with flu like symptoms. he denies shortness of breath, chest pain, palpitations, nor edema. he stated that he  tried OTC medications which afforded only slight relief of symptoms. he denies nausea, vomiting, abdominal pain, nor any other symptoms.    Patient states he had not received any COVID vaccine yet.  Patient states he have not yet received flu shot for this season.    Review of Systems  General: no weight loss, generally healthy, (+) fatigue  Head: (+) headaches / sinus pain, no vertigo, no injury  Eyes: no diplopia, no tearing, no pain,   Ears: no change in hearing, no tinnitus, no bleeding, no vertigo  Mouth:  no dental difficulties, no gingival bleeding, no sore throat, no loss of sense of taste  Nose: (+) congestion, (+) discharge, no bleeding, no obstruction, no loss of sense of smell  Neck: no stiffness, no pain, no tenderness, no masses, no bruit  Pulmonary: no dyspnea, no wheezing, no hemoptysis, no cough  Cardiovascular: no chest pain, no palpitations, no syncope, no orthopnea  Gastrointestinal: no change in appetite, no dysphagia, no abdominal pains, no diarrhea, no emesis, no melena  Genito Urinary: no dysuria, no urinary urgency, no nocturia, no incontinence, no change in nature of urine  Musculoskeletal: (+) muscle ache, no joint pain, no limitation of  range of motion, no paresthesia, no numbness  Constitutional: (+) fever, (+) chills, no night sweats    Objective   NO VITAL SIGNS TAKEN FOR THIS PATIENT (VIRTUAL VISIT CONSULT - PHONE ONLY)    Physical Exam  PHYSICAL EXAMINATION WAS NOT DONE FOR THIS PATIENT (VIRTUAL VISIT CONSULT - PHONE ONLY)    Assessment/Plan   Problem List Items Addressed This Visit    None  Visit Diagnoses         Codes    Viral upper respiratory infection    -  Primary J06.9    Relevant Medications    brompheniramine-pseudoeph-DM 2-30-10 mg/5 mL syrup    Nasopharyngitis     J00    Relevant Medications    brompheniramine-pseudoeph-DM 2-30-10 mg/5 mL syrup    Nasal congestion with rhinorrhea     R09.81, J34.89    Relevant Medications    brompheniramine-pseudoeph-DM 2-30-10 mg/5 mL syrup        DISCHARGE SUMMARY:   Diagnosis, treatment, treatment options, and possible complications of today's illness discussed and explained to patient. Patient to take medication/s associated with this visit. Patient may also take OTC analgesic/antipyretic if needed for pain/fever. Advised to increase oral fluid intake. Advised steam inhalation if needed to relieve congestion. Advised warm saline gargle if needed to relieve throat discomfort. Advised to come back if with worsening or persistent symptoms. Patient verbalized understanding of plan of care.    Patient to come back in 7 - 10 days if needed for worsening symptoms.

## 2023-10-19 NOTE — TELEPHONE ENCOUNTER
Rx Refill Request Telephone Encounter    Name:  Inocente Gautam  :  [id number]  Medication Name:  prilosec 20mg  Specific Pharmacy location:  [facility] in [place]   Date of last appointment:  23  Date of next appointment:  10/23/23  Best number to reach patient:  [phone]

## 2023-10-20 ENCOUNTER — TELEPHONE (OUTPATIENT)
Dept: PRIMARY CARE | Facility: CLINIC | Age: 28
End: 2023-10-20
Payer: COMMERCIAL

## 2023-10-20 NOTE — TELEPHONE ENCOUNTER
Patient's wife Betzaida called in regarding the patient. She stated the patient had a VV with Rasheed yesterday for nasal congestion, nasal discharge, headache/sinus pain, fatigue, muscle ache, chills and fever. She stated Rasheed had prescribed brompheniramine-pseudoeph-DM 2-30-10 mg/5 mL syrup for this. Betzaida stated the patient became ill after taking care of a sick calf that recently  and is worried the medication prescribed will not work. She is wondering if Dr. Catalan can fit the patient in for a VV today?  Please Advise

## 2023-10-20 NOTE — TELEPHONE ENCOUNTER
Per Ap--- This sounds viral--- has to wait it out. Spoke with Betzaida she is aware and if he is not feeling better or improved will call the office

## 2023-10-23 ENCOUNTER — APPOINTMENT (OUTPATIENT)
Dept: PRIMARY CARE | Facility: CLINIC | Age: 28
End: 2023-10-23
Payer: COMMERCIAL

## 2023-10-23 ENCOUNTER — TELEPHONE (OUTPATIENT)
Dept: PRIMARY CARE | Facility: CLINIC | Age: 28
End: 2023-10-23

## 2023-10-23 ENCOUNTER — TELEMEDICINE (OUTPATIENT)
Dept: PRIMARY CARE | Facility: CLINIC | Age: 28
End: 2023-10-23
Payer: COMMERCIAL

## 2023-10-23 DIAGNOSIS — K52.9 GASTROENTERITIS: Primary | ICD-10-CM

## 2023-10-23 DIAGNOSIS — J34.89 NASAL CONGESTION WITH RHINORRHEA: ICD-10-CM

## 2023-10-23 DIAGNOSIS — R09.81 NASAL CONGESTION WITH RHINORRHEA: ICD-10-CM

## 2023-10-23 DIAGNOSIS — R19.7 DIARRHEA, UNSPECIFIED TYPE: ICD-10-CM

## 2023-10-23 DIAGNOSIS — J00 NASOPHARYNGITIS: ICD-10-CM

## 2023-10-23 PROCEDURE — 99214 OFFICE O/P EST MOD 30 MIN: CPT | Performed by: NURSE PRACTITIONER

## 2023-10-23 RX ORDER — CIPROFLOXACIN 500 MG/1
500 TABLET ORAL 2 TIMES DAILY
Qty: 20 TABLET | Refills: 0 | Status: SHIPPED | OUTPATIENT
Start: 2023-10-23 | End: 2023-10-23 | Stop reason: ENTERED-IN-ERROR

## 2023-10-23 RX ORDER — LOPERAMIDE HCL 2 MG
2 TABLET ORAL 4 TIMES DAILY PRN
Qty: 20 TABLET | Refills: 0 | Status: SHIPPED | OUTPATIENT
Start: 2023-10-23 | End: 2023-10-23 | Stop reason: ENTERED-IN-ERROR

## 2023-10-23 RX ORDER — CIPROFLOXACIN 500 MG/1
500 TABLET ORAL 2 TIMES DAILY
Qty: 20 TABLET | Refills: 0 | Status: SHIPPED | OUTPATIENT
Start: 2023-10-23 | End: 2023-11-02

## 2023-10-23 RX ORDER — LOPERAMIDE HCL 2 MG
2 TABLET ORAL 4 TIMES DAILY PRN
Qty: 20 TABLET | Refills: 0 | Status: SHIPPED | OUTPATIENT
Start: 2023-10-23 | End: 2023-10-28

## 2023-10-23 NOTE — TELEPHONE ENCOUNTER
Patient called stating he is not feeling any better. He would like to know if he should see dr. Catalan or if he needs different medication.  Please advise

## 2023-10-23 NOTE — PROGRESS NOTES
Subjective   Patient ID: Inocente Gautam is a 27 y.o. male is here for follow up with regards to respiratory infection symptoms and diarrhea.     HPI  Patient is a 27 y.o. male who CONSULTED AT [facility] VIRTUAL CLINIC - PHONE ONLY today.  He was seen by virtual visit last October 19, 2023 (4 days). He was diagnosed to have viral respiratory infection and was given Bromfed DM.     Interval history: Patient states that since last visit, his symptoms were now better. He is having new symptoms of diarrhea - watery stool, brown, 4 x a day, no blood. He denies nausea, vomiting, nor abdominal pain. He has some dizziness. He states the nasal congestion, nasal discharge, headache / sinus pain, fatigue, and muscle aches were almost gone. He denies having cough, sore throat, cough, loss of sense of taste, loss of sense of smell, chills nor fever.     Review of Systems  General: no weight loss, generally healthy, (+) hx fatigue  Head: (+) hx headaches / sinus pain, no vertigo, no injury  Eyes: no diplopia, no tearing, no pain,   Ears: no change in hearing, no tinnitus, no bleeding, no vertigo  Mouth:  no dental difficulties, no gingival bleeding, no sore throat, no loss of sense of taste  Nose: (+) hx congestion, (+) hx discharge, no bleeding, no obstruction, no loss of sense of smell  Neck: no stiffness, no pain, no tenderness, no masses, no bruit  Pulmonary: no dyspnea, no wheezing, no hemoptysis, no cough  Cardiovascular: no chest pain, no palpitations, no syncope, no orthopnea  Gastrointestinal: no change in appetite, no dysphagia, no abdominal pains, (+) diarrhea, no emesis, no melena  Genito Urinary: no dysuria, no urinary urgency, no nocturia, no incontinence, no change in nature of urine  Musculoskeletal: (+) muscle ache, no joint pain, no limitation of range of motion, no paresthesia, no numbness  Constitutional: (+) fever, (+) chills, no night sweats    Objective   NO VITAL SIGNS TAKEN FOR THIS PATIENT (VIRTUAL  VISIT CONSULT - PHONE ONLY)    Physical Exam  PHYSICAL EXAMINATION WAS NOT DONE FOR THIS PATIENT (VIRTUAL VISIT CONSULT - PHONE ONLY)    Assessment/Plan   Problem List Items Addressed This Visit    None  Visit Diagnoses         Codes    Gastroenteritis    -  Primary K52.9    Relevant Medications    loperamide (Imodium A-D) 2 mg tablet    Nasal congestion with rhinorrhea     R09.81, J34.89    Relevant Medications    ciprofloxacin (Cipro) 500 mg tablet    Diarrhea, unspecified type     R19.7    Relevant Medications    loperamide (Imodium A-D) 2 mg tablet    Nasopharyngitis     J00    Relevant Medications    ciprofloxacin (Cipro) 500 mg tablet        DISCHARGE SUMMARY:   Probable diagnosis, differential diagnosis, treatment, treatment options, and probable complications were discussed and explained to patient. Patient to take medication/s associated with this visit. he was educated and advised on low fiber, BRAT diet. Advised to avoid high fiber foods. Advised to increase fluid intake (water and electrolyte drinks) as tolerated, if with no nausea, nor vomiting. Patient educated on indications for stool examination. Advised steam inhalation if needed to relieve congestion. Advised warm saline gargle if needed to relieve throat discomfort. Advised to come back if with worsening or persistent symptoms. Patient verbalized understanding of plan of care.    Patient to come back in 7 - 10 days if needed for worsening symptoms.

## 2023-10-23 NOTE — LETTER
October 23, 2023     Patient: Inocente Gautam   YOB: 1995   Date of Visit: 10/23/2023       To Whom It May Concern:    Inocente Gautam was seen in my clinic on 10/23/2023 at 1:30 pm. Please excuse Inocente for his absence from work on 10/23/2023, patient may return on 10/26/2023.     If you have any questions or concerns, please don't hesitate to call.         Sincerely,         HERMANN Valdez-CNP        CC: No Recipients

## 2023-10-23 NOTE — TELEPHONE ENCOUNTER
Inocente was told he could have a work excuse emailed to him for his visit and is having a hard time receiving  it so roland called with her email [email or web address]

## 2023-10-23 NOTE — TELEPHONE ENCOUNTER
Patient called in for an update. He stated he had to call off of work today and would like a call back ASAP

## 2023-10-23 NOTE — TELEPHONE ENCOUNTER
Inocente was told he could have a work excuse emailed to him for his visit and is having a hard time receiving  it so roland called with her email [email or web address]

## 2023-10-23 NOTE — LETTER
October 23, 2023     Patient: Inocente Gautam   YOB: 1995   Date of Visit: 10/19/2023       To Whom It May Concern:    Inocente Gautam was seen in my clinic on 10/19/2023. Please excuse Inocente for his absence from work on 10/19/2023. Patient may return to work on 10/22/2023.     If you have any questions or concerns, please don't hesitate to call.         Sincerely,         HERMANN Valdez-CNP        CC: No Recipients

## 2023-10-23 NOTE — PATIENT INSTRUCTIONS
DISCHARGE SUMMARY:   Probable diagnosis, differential diagnosis, treatment, treatment options, and probable complications were discussed and explained to patient. Patient to take medication/s associated with this visit. he was educated and advised on low fiber, BRAT diet. Advised to avoid high fiber foods. Advised to increase fluid intake (water and electrolyte drinks) as tolerated, if with no nausea, nor vomiting. Patient educated on indications for stool examination. Advised steam inhalation if needed to relieve congestion. Advised warm saline gargle if needed to relieve throat discomfort. Advised to come back if with worsening or persistent symptoms. Patient verbalized understanding of plan of care.    Patient to come back in 7 - 10 days if needed for worsening symptoms.

## 2023-10-23 NOTE — TELEPHONE ENCOUNTER
Per Dr. Catalan patient needs evaluated and can see himself or Rasheed in convenient care whichever has a first available    Patient scheduled with Rasheed

## 2023-12-04 ENCOUNTER — TELEPHONE (OUTPATIENT)
Dept: PRIMARY CARE | Facility: CLINIC | Age: 28
End: 2023-12-04
Payer: COMMERCIAL

## 2023-12-04 NOTE — TELEPHONE ENCOUNTER
Patient called in stating [facility] contacted him to inform him that prilosec is not covered by his insurance anymore and is needing to be switched to an alternative medication. Please advise.

## 2024-04-23 ENCOUNTER — TELEPHONE (OUTPATIENT)
Dept: CARDIOLOGY | Facility: CLINIC | Age: 29
End: 2024-04-23

## 2024-04-23 DIAGNOSIS — K21.9 GASTROESOPHAGEAL REFLUX DISEASE WITHOUT ESOPHAGITIS: ICD-10-CM

## 2024-04-23 RX ORDER — OMEPRAZOLE 20 MG/1
CAPSULE, DELAYED RELEASE ORAL
Qty: 90 CAPSULE | Refills: 0 | Status: SHIPPED | OUTPATIENT
Start: 2024-04-23

## 2024-04-23 NOTE — TELEPHONE ENCOUNTER
Rx Refill Request Telephone Encounter    Name:  Inocente Gautam  :  [id number]  Medication Name:  omeprazole 20MG   Specific Pharmacy location:  [facility] pharmacy in [place]   Date of last appointment:  23  Date of next appointment:  N/A   Best number to reach patient:  [phone]

## 2024-04-23 NOTE — TELEPHONE ENCOUNTER
Patient called and would like to know if he can stop the Norvasc 10 mg. He has ran out and hasn't been taking it and his bp at his PCP has been in the 118/120/70's.  Please call him back and let him know at [phone]

## 2024-04-24 NOTE — TELEPHONE ENCOUNTER
Per Dr. Adryan Wolf , patient may stop the Norvasc.  Call placed to patient and advised.  Patient verbalized understanding.

## 2024-06-17 DIAGNOSIS — F41.1 GENERALIZED ANXIETY DISORDER: ICD-10-CM

## 2024-06-17 RX ORDER — BUPROPION HYDROCHLORIDE 300 MG/1
300 TABLET ORAL DAILY
Qty: 90 TABLET | Refills: 3 | OUTPATIENT
Start: 2024-06-17

## 2024-06-17 NOTE — TELEPHONE ENCOUNTER
Rx Refill Request Telephone Encounter    Name:  Inocente Gautam  :  [id number]  Medication Name:  bupropion 300mg   Specific Pharmacy location:  [facility] pharmacy in [place]   Date of last appointment:  23  Date of next appointment:  N/A

## 2024-06-21 DIAGNOSIS — I10 PRIMARY HYPERTENSION: Primary | ICD-10-CM

## 2024-06-21 NOTE — TELEPHONE ENCOUNTER
Patient left voicemail stating that his blood pressure has been running high and he would like to know if Dr. Wolf would like to change his medication.  I spoke to patient he states that at the highest that his blood pressure was 147/97        6/25/24  Orderes berry'vika from Dr. Wolf.  Pt. To begin taking Amlodipine 10mg every day, in addition to what he already takes.  Called pt., no answer.  Called his wife's number, mother in law answered and states she is aware of elevated BP as he had called from her house to get orders.  Verified pharmacy is correct.  Sent RX per order pending sig.  Marielos

## 2024-06-25 RX ORDER — AMLODIPINE BESYLATE 10 MG/1
10 TABLET ORAL DAILY
Qty: 90 TABLET | Refills: 3 | Status: SHIPPED | OUTPATIENT
Start: 2024-06-25 | End: 2025-06-25

## 2024-09-03 DIAGNOSIS — F41.1 GENERALIZED ANXIETY DISORDER: ICD-10-CM

## 2024-09-03 RX ORDER — BUPROPION HYDROCHLORIDE 300 MG/1
300 TABLET ORAL DAILY
Qty: 30 TABLET | Refills: 0 | Status: SHIPPED | OUTPATIENT
Start: 2024-09-03 | End: 2024-09-04 | Stop reason: SDUPTHER

## 2024-09-03 NOTE — TELEPHONE ENCOUNTER
Rx Refill Request Telephone Encounter    Name:  Inocente WALTON Dain  :  [id number]  Medication Name:  bupropion XL (Wellbutrin XL) 300 mg   Specific Pharmacy location:  [facility]  Date of last appointment:    Date of next appointment:  2024  Best number to reach patient:  [phone]

## 2024-09-03 NOTE — TELEPHONE ENCOUNTER
Patient is completely out. Pended 30 days-- informed patient sending in 30 days, and at OV tomorrow will send out for the remainder of the year.

## 2024-09-04 ENCOUNTER — TELEMEDICINE (OUTPATIENT)
Dept: PRIMARY CARE | Facility: CLINIC | Age: 29
End: 2024-09-04
Payer: COMMERCIAL

## 2024-09-04 DIAGNOSIS — Z00.00 HEALTHCARE MAINTENANCE: ICD-10-CM

## 2024-09-04 DIAGNOSIS — K21.9 GASTROESOPHAGEAL REFLUX DISEASE WITHOUT ESOPHAGITIS: ICD-10-CM

## 2024-09-04 DIAGNOSIS — G89.29 CHRONIC BILATERAL LOW BACK PAIN WITHOUT SCIATICA: ICD-10-CM

## 2024-09-04 DIAGNOSIS — F41.1 GENERALIZED ANXIETY DISORDER: Primary | ICD-10-CM

## 2024-09-04 DIAGNOSIS — M54.50 CHRONIC BILATERAL LOW BACK PAIN WITHOUT SCIATICA: ICD-10-CM

## 2024-09-04 PROCEDURE — 99213 OFFICE O/P EST LOW 20 MIN: CPT | Performed by: FAMILY MEDICINE

## 2024-09-04 RX ORDER — BUPROPION HYDROCHLORIDE 300 MG/1
300 TABLET ORAL DAILY
Qty: 90 TABLET | Refills: 0 | Status: SHIPPED | OUTPATIENT
Start: 2024-09-04

## 2024-09-04 RX ORDER — IBUPROFEN 800 MG/1
800 TABLET ORAL DAILY PRN
Qty: 60 TABLET | Refills: 0 | Status: SHIPPED | OUTPATIENT
Start: 2024-09-04

## 2024-09-04 RX ORDER — OMEPRAZOLE 20 MG/1
20 CAPSULE, DELAYED RELEASE ORAL DAILY
Qty: 90 CAPSULE | Refills: 0 | Status: SHIPPED | OUTPATIENT
Start: 2024-09-04

## 2024-09-04 ASSESSMENT — ANXIETY QUESTIONNAIRES
7. FEELING AFRAID AS IF SOMETHING AWFUL MIGHT HAPPEN: NOT AT ALL
5. BEING SO RESTLESS THAT IT IS HARD TO SIT STILL: NOT AT ALL
3. WORRYING TOO MUCH ABOUT DIFFERENT THINGS: SEVERAL DAYS
2. NOT BEING ABLE TO STOP OR CONTROL WORRYING: SEVERAL DAYS
6. BECOMING EASILY ANNOYED OR IRRITABLE: NOT AT ALL
GAD7 TOTAL SCORE: 3
4. TROUBLE RELAXING: NOT AT ALL
IF YOU CHECKED OFF ANY PROBLEMS ON THIS QUESTIONNAIRE, HOW DIFFICULT HAVE THESE PROBLEMS MADE IT FOR YOU TO DO YOUR WORK, TAKE CARE OF THINGS AT HOME, OR GET ALONG WITH OTHER PEOPLE: NOT DIFFICULT AT ALL
1. FEELING NERVOUS, ANXIOUS, OR ON EDGE: SEVERAL DAYS

## 2024-09-04 ASSESSMENT — ENCOUNTER SYMPTOMS
NUMBNESS: 0
CONSTIPATION: 0
ABDOMINAL PAIN: 0
HEADACHES: 0
COUGH: 0
CHILLS: 0
FREQUENCY: 0
SHORTNESS OF BREATH: 0
PALPITATIONS: 0
HEMATURIA: 0
VOMITING: 0
DIZZINESS: 0
DEPRESSED MOOD: 0
DYSURIA: 0
WHEEZING: 0
IRRITABILITY: 1
NERVOUS/ANXIOUS: 1
DIARRHEA: 0
FEVER: 0
SORE THROAT: 0
TREMORS: 0
RESTLESSNESS: 0
RHINORRHEA: 0

## 2024-09-04 NOTE — ASSESSMENT & PLAN NOTE
Stable based on symptoms and exam. Continue established treatment plan.  The risks and benefits of my recommendations, as well as other treatment options were discussed with the patient today.    The side effects of the medications were discussed.  Advised not to take the medication with alcohol .  Exercise regularly and this can give you a sense of well being and help decrease feelings of anxiety.;  Get plenty of sleep. Sleep rests your brain as well as your body, and can improve your general sense of wellbeing as well as your mood.;  Avoid alcohol and drug abuse. It may seem that alcohol or drugs relax you. But in the long run they make anxiety worse and cause more problems.;  Avoid caffeine. Caffeine is found in coffee, tea, soft drinks and chocolate. Caffeine may increase your sense of anxiety because it stimulates your nervous system. Also avoid over-the-counter diet pills, and cough and cold medicines that contain a decongestant.;  Confront the things that have made you anxious in the past. Begin by just picturing yourself confronting these things. By doing this, you can get used to the idea of confronting the things that make you anxious before you actually do it. After you feel more comfortable picturing yourself confronting these things, you can begin to actually face them.;  If you feel yourself getting anxious, practice a relaxation technique or focus on a simple task, such as counting backward from 100 to 0.;  Although feelings of anxiety are scary, they won't hurt you. Label the level of your fear from 0 to 10 and keep track as it goes up and down. Notice that it doesn't stay at a very high level for more than a few seconds. When the fear comes, accept it. Wait and give it time to pass without running away from it.;  Take medications as prescribed.

## 2024-09-04 NOTE — PROGRESS NOTES
Subjective   Patient ID: Inocente Gautam is a 28 y.o. male who presents for Follow-up (Anxiety,Gerd).    He is also following up on GERD for which he has been on Omeprazole. He has been taking the medication regularly with control of his symptoms. He reports no side effects from the medication.    Anxiety  Presents for follow-up visit. Symptoms include irritability and nervous/anxious behavior. Patient reports no chest pain, depressed mood, dizziness, palpitations, restlessness, shortness of breath or suicidal ideas. Symptoms occur occasionally. The severity of symptoms is mild. The patient sleeps 8 hours per night. The quality of sleep is good. Nighttime awakenings: one to two.     Compliance with medications is %.        Review of Systems   Constitutional:  Positive for irritability. Negative for chills and fever.   HENT:  Negative for congestion, ear pain, nosebleeds, rhinorrhea and sore throat.    Respiratory:  Negative for cough, shortness of breath and wheezing.    Cardiovascular:  Negative for chest pain, palpitations and leg swelling.   Gastrointestinal:  Negative for abdominal pain, constipation, diarrhea and vomiting.   Genitourinary:  Negative for dysuria, frequency and hematuria.   Neurological:  Negative for dizziness, tremors, numbness and headaches.   Psychiatric/Behavioral:  Negative for suicidal ideas. The patient is nervous/anxious.        Objective   There were no vitals taken for this visit.    Physical Exam  Constitutional:       General: He is not in acute distress.     Appearance: Normal appearance.   HENT:      Head: Normocephalic and atraumatic.      Mouth/Throat:      Mouth: Mucous membranes are moist.      Pharynx: Oropharynx is clear. No oropharyngeal exudate or posterior oropharyngeal erythema.   Eyes:      General: No scleral icterus.     Extraocular Movements: Extraocular movements intact.      Pupils: Pupils are equal, round, and reactive to light.   Cardiovascular:      Rate and  Rhythm: Normal rate and regular rhythm.      Pulses: Normal pulses.      Heart sounds: No murmur heard.     No friction rub. No gallop.   Pulmonary:      Effort: Pulmonary effort is normal.      Breath sounds: No wheezing, rhonchi or rales.   Skin:     General: Skin is warm.      Coloration: Skin is not jaundiced or pale.      Findings: No erythema or rash.   Neurological:      General: No focal deficit present.      Mental Status: He is alert and oriented to person, place, and time.      Cranial Nerves: No cranial nerve deficit.      Sensory: No sensory deficit.      Coordination: Coordination normal.      Gait: Gait normal.         Legacy Encounter on 06/07/2022   Component Date Value Ref Range Status    WBC 06/07/2022 7.0  4.4 - 11.3 x10E9/L Final    RBC 06/07/2022 4.94  4.50 - 5.90 x10E12/L Final    Hemoglobin 06/07/2022 15.1  13.5 - 17.5 g/dL Final    Hematocrit 06/07/2022 44.7  41.0 - 52.0 % Final    MCV 06/07/2022 90  80 - 100 fL Final    MCHC 06/07/2022 33.8  32.0 - 36.0 g/dL Final    Platelets 06/07/2022 300  150 - 450 x10E9/L Final    RDW 06/07/2022 11.8  11.5 - 14.5 % Final    Neutrophils % 06/07/2022 54.7  40.0 - 80.0 % Final    Immature Granulocytes %, Automated 06/07/2022 0.7  0.0 - 0.9 % Final    Comment:  Immature Granulocyte Count (IG) includes promyelocytes,    myelocytes and metamyelocytes but does not include bands.   Percent differential counts (%) should be interpreted in the   context of the absolute cell counts (cells/L).      Lymphocytes % 06/07/2022 31.4  13.0 - 44.0 % Final    Monocytes % 06/07/2022 11.0  2.0 - 10.0 % Final    Eosinophils % 06/07/2022 1.3  0.0 - 6.0 % Final    Basophils % 06/07/2022 0.9  0.0 - 2.0 % Final    Neutrophils Absolute 06/07/2022 3.84  1.20 - 7.70 x10E9/L Final    Lymphocytes Absolute 06/07/2022 2.20  1.20 - 4.80 x10E9/L Final    Monocytes Absolute 06/07/2022 0.77  0.10 - 1.00 x10E9/L Final    Eosinophils Absolute 06/07/2022 0.09  0.00 - 0.70 x10E9/L Final     Basophils Absolute 06/07/2022 0.06  0.00 - 0.10 x10E9/L Final    Glucose 06/07/2022 80  74 - 99 mg/dL Final    Sodium 06/07/2022 139  136 - 145 mmol/L Final    Potassium 06/07/2022 4.2  3.5 - 5.3 mmol/L Final    Chloride 06/07/2022 100  98 - 107 mmol/L Final    Bicarbonate 06/07/2022 31  21 - 32 mmol/L Final    Anion Gap 06/07/2022 12  10 - 20 mmol/L Final    Urea Nitrogen 06/07/2022 16  6 - 23 mg/dL Final    Creatinine 06/07/2022 0.89  0.50 - 1.30 mg/dL Final    GFR MALE 06/07/2022 >90  >90 mL/min/1.73m2 Final    Comment:  CALCULATIONS OF ESTIMATED GFR ARE PERFORMED   USING THE 2021 CKD-EPI STUDY REFIT EQUATION   WITHOUT THE RACE VARIABLE FOR THE IDMS-TRACEABLE   CREATININE METHODS.    https://jasn.asnjournals.org/content/early/2021/09/22/ASN.2982435955      Calcium 06/07/2022 9.6  8.6 - 10.3 mg/dL Final    Albumin 06/07/2022 4.8  3.4 - 5.0 g/dL Final    Alkaline Phosphatase 06/07/2022 52  33 - 120 U/L Final    Total Protein 06/07/2022 7.5  6.4 - 8.2 g/dL Final    AST 06/07/2022 21  9 - 39 U/L Final    Total Bilirubin 06/07/2022 0.5  0.0 - 1.2 mg/dL Final    ALT (SGPT) 06/07/2022 28  10 - 52 U/L Final    Comment:  Patients treated with Sulfasalazine may generate    falsely decreased results for ALT.      Cholesterol 06/07/2022 218 (H)  0 - 199 mg/dL Final    Comment: .      AGE      DESIRABLE   BORDERLINE HIGH   HIGH     0-19 Y     0 - 169       170 - 199     >/= 200    20-24 Y     0 - 189       190 - 224     >/= 225         >24 Y     0 - 199       200 - 239     >/= 240   **All ranges are based on fasting samples. Specific   therapeutic targets will vary based on patient-specific   cardiac risk.  .   Pediatric guidelines reference:Pediatrics 2011, 128(S5).   Adult guidelines reference: NCEP ATPIII Guidelines,     TRUDY 2001, 258:2486-97  .   Venipuncture immediately after or during the    administration of Metamizole may lead to falsely   low results. Testing should be performed immediately   prior to Metamizole  dosing.      HDL 06/07/2022 44.0  mg/dL Final    Comment: .      AGE      VERY LOW   LOW     NORMAL    HIGH       0-19 Y       < 35   < 40     40-45     ----    20-24 Y       ----   < 40       >45     ----      >24 Y       ----   < 40     40-60      >60  .      Cholesterol/HDL Ratio 06/07/2022 5.0   Final    Comment: REF VALUES  DESIRABLE  < 3.4  HIGH RISK  > 5.0      LDL 06/07/2022 128 (H)  0 - 99 mg/dL Final    Comment: .                           NEAR      BORD      AGE      DESIRABLE  OPTIMAL    HIGH     HIGH     VERY HIGH     0-19 Y     0 - 109     ---    110-129   >/= 130     ----    20-24 Y     0 - 119     ---    120-159   >/= 160     ----      >24 Y     0 -  99   100-129  130-159   160-189     >/=190  .      VLDL 06/07/2022 46 (H)  0 - 40 mg/dL Final    Triglycerides 06/07/2022 230 (H)  0 - 149 mg/dL Final    Comment: .      AGE      DESIRABLE   BORDERLINE HIGH   HIGH     VERY HIGH   0 D-90 D    19 - 174         ----         ----        ----  91 D- 9 Y     0 -  74        75 -  99     >/= 100      ----    10-19 Y     0 -  89        90 - 129     >/= 130      ----    20-24 Y     0 - 114       115 - 149     >/= 150      ----         >24 Y     0 - 149       150 - 199    200- 499    >/= 500  .   Venipuncture immediately after or during the    administration of Metamizole may lead to falsely   low results. Testing should be performed immediately   prior to Metamizole dosing.      Non HDL Cholesterol 06/07/2022 174  mg/dL Final    Comment:     AGE      DESIRABLE   BORDERLINE HIGH   HIGH     VERY HIGH     0-19 Y     0 - 119       120 - 144     >/= 145    >/= 160    20-24 Y     0 - 149       150 - 189     >/= 190      ----         >24 Y    30 MG/DL ABOVE LDL CHOLESTEROL GOAL  .        Assessment/Plan   Problem List Items Addressed This Visit       Chronic bilateral low back pain without sciatica     Natural history and expected course discussed. Questions answered.  Educational material distributed.  Proper lifting,  bending technique discussed.  Stretching exercises discussed.  Regular aerobic and trunk strengthening exercises discussed.  Ice to affected area as needed for local pain relief.  Heat to affected area as needed for local pain relief.          Relevant Medications    ibuprofen 800 mg tablet    Generalized anxiety disorder - Primary     Stable based on symptoms and exam. Continue established treatment plan.  The risks and benefits of my recommendations, as well as other treatment options were discussed with the patient today.    The side effects of the medications were discussed.  Advised not to take the medication with alcohol .  Exercise regularly and this can give you a sense of well being and help decrease feelings of anxiety.;  Get plenty of sleep. Sleep rests your brain as well as your body, and can improve your general sense of wellbeing as well as your mood.;  Avoid alcohol and drug abuse. It may seem that alcohol or drugs relax you. But in the long run they make anxiety worse and cause more problems.;  Avoid caffeine. Caffeine is found in coffee, tea, soft drinks and chocolate. Caffeine may increase your sense of anxiety because it stimulates your nervous system. Also avoid over-the-counter diet pills, and cough and cold medicines that contain a decongestant.;  Confront the things that have made you anxious in the past. Begin by just picturing yourself confronting these things. By doing this, you can get used to the idea of confronting the things that make you anxious before you actually do it. After you feel more comfortable picturing yourself confronting these things, you can begin to actually face them.;  If you feel yourself getting anxious, practice a relaxation technique or focus on a simple task, such as counting backward from 100 to 0.;  Although feelings of anxiety are scary, they won't hurt you. Label the level of your fear from 0 to 10 and keep track as it goes up and down. Notice that it doesn't  stay at a very high level for more than a few seconds. When the fear comes, accept it. Wait and give it time to pass without running away from it.;  Take medications as prescribed.           Relevant Medications    buPROPion XL (Wellbutrin XL) 300 mg 24 hr tablet    GERD (gastroesophageal reflux disease)     Stable based on symptoms and exam. Continue established treatment plan.           Relevant Medications    omeprazole (PriLOSEC) 20 mg DR capsule    Healthcare maintenance    Relevant Orders    Comprehensive metabolic panel    Lipid Panel    CBC and Auto Differential            Scribe Attestation  By signing my name below, I, Debra Mclaughlin, Scribe   attest that this documentation has been prepared under the direction and in the presence of Andreas Catalan MD .

## 2024-09-17 ENCOUNTER — TELEPHONE (OUTPATIENT)
Dept: CARDIOLOGY | Facility: CLINIC | Age: 29
End: 2024-09-17
Payer: COMMERCIAL

## 2024-09-17 DIAGNOSIS — I10 PRIMARY HYPERTENSION: ICD-10-CM

## 2024-09-17 NOTE — TELEPHONE ENCOUNTER
Call returned to patient.  He stated that the practice is [facility].  Printed for Dr. Adryan Wolf to review next week.

## 2024-09-17 NOTE — TELEPHONE ENCOUNTER
Patient called and LM stating he would like a referral to a different provider in [place] as he does not live near [place] anymore. Patient did not leave name of provider but did leave fax number: [phone]. Patient is also requesting 30 day fills until an appointment. Routed to Deena Ta LPN

## 2024-09-19 DIAGNOSIS — I10 PRIMARY HYPERTENSION: ICD-10-CM

## 2024-09-19 RX ORDER — LOSARTAN POTASSIUM 100 MG/1
100 TABLET ORAL DAILY
Qty: 30 TABLET | Refills: 0 | Status: SHIPPED | OUTPATIENT
Start: 2024-09-19 | End: 2024-10-19

## 2024-09-19 NOTE — TELEPHONE ENCOUNTER
Called to schedule appointment with . Patient stated they are going to see another doctor closer to them. He said he had actually asked for referral but I informed him since Dr. Wolf is out office he hasn't looked at and would look at it. He asked if he could get letter since he already has an appointment scheduled with the other office.

## 2024-09-19 NOTE — TELEPHONE ENCOUNTER
Received request for prescription refills for patient.   Patient follows with Dr. Wolf      Request is for Losartan 100mg QD  Is patient currently on medication yes    Last OV 9/22/23  Next OV needs OV- sec'y notified    Pended short supply for signing and sent to provider

## 2024-09-25 RX ORDER — LOSARTAN POTASSIUM 100 MG/1
100 TABLET ORAL DAILY
Qty: 30 TABLET | Refills: 0 | Status: SHIPPED | OUTPATIENT
Start: 2024-09-25 | End: 2024-10-25

## 2024-09-25 RX ORDER — AMLODIPINE BESYLATE 10 MG/1
10 TABLET ORAL DAILY
Qty: 30 TABLET | Refills: 0 | Status: SHIPPED | OUTPATIENT
Start: 2024-09-25 | End: 2024-10-25

## 2024-10-04 ENCOUNTER — APPOINTMENT (OUTPATIENT)
Dept: PRIMARY CARE | Facility: CLINIC | Age: 29
End: 2024-10-04
Payer: COMMERCIAL

## 2024-10-04 VITALS
HEART RATE: 67 BPM | WEIGHT: 200 LBS | SYSTOLIC BLOOD PRESSURE: 112 MMHG | BODY MASS INDEX: 28 KG/M2 | TEMPERATURE: 97.3 F | HEIGHT: 71 IN | RESPIRATION RATE: 16 BRPM | DIASTOLIC BLOOD PRESSURE: 64 MMHG | OXYGEN SATURATION: 98 %

## 2024-10-04 DIAGNOSIS — K21.9 GASTROESOPHAGEAL REFLUX DISEASE WITHOUT ESOPHAGITIS: ICD-10-CM

## 2024-10-04 DIAGNOSIS — F41.1 GENERALIZED ANXIETY DISORDER: ICD-10-CM

## 2024-10-04 DIAGNOSIS — R53.82 CHRONIC FATIGUE: ICD-10-CM

## 2024-10-04 DIAGNOSIS — Z00.00 HEALTHCARE MAINTENANCE: ICD-10-CM

## 2024-10-04 RX ORDER — BUPROPION HYDROCHLORIDE 300 MG/1
300 TABLET ORAL DAILY
Qty: 90 TABLET | Refills: 3 | Status: SHIPPED | OUTPATIENT
Start: 2024-10-04

## 2024-10-04 RX ORDER — OMEPRAZOLE 20 MG/1
20 CAPSULE, DELAYED RELEASE ORAL DAILY
Qty: 90 CAPSULE | Refills: 1 | Status: SHIPPED | OUTPATIENT
Start: 2024-10-04

## 2024-10-04 ASSESSMENT — PATIENT HEALTH QUESTIONNAIRE - PHQ9
1. LITTLE INTEREST OR PLEASURE IN DOING THINGS: NOT AT ALL
SUM OF ALL RESPONSES TO PHQ9 QUESTIONS 1 AND 2: 0
2. FEELING DOWN, DEPRESSED OR HOPELESS: NOT AT ALL

## 2024-10-04 NOTE — PROGRESS NOTES
"Subjective   Patient ID: Inocente Gautam is a 28 y.o. male who presents for Annual Exam (Referral for PCP).    Patient presents today for annual physical exam.     GERD  He complains of belching, chest pain, heartburn and water brash. He reports no abdominal pain, no choking, no coughing, no dysphagia, no early satiety, no globus sensation, no hoarse voice, no nausea, no sore throat, no tooth decay or no wheezing.       Review of Systems   Constitutional:  Negative for chills and fever.   HENT:  Negative for congestion, ear pain, hoarse voice, nosebleeds, rhinorrhea and sore throat.    Respiratory:  Negative for cough, choking, shortness of breath and wheezing.    Cardiovascular:  Positive for chest pain. Negative for palpitations and leg swelling.   Gastrointestinal:  Positive for heartburn. Negative for abdominal pain, constipation, diarrhea, dysphagia, nausea and vomiting.   Genitourinary:  Negative for dysuria, frequency and hematuria.   Neurological:  Negative for dizziness, tremors, numbness and headaches.       Objective   /64 (BP Location: Left arm, Patient Position: Sitting)   Pulse 67   Temp 36.3 °C (97.3 °F)   Resp 16   Ht 1.803 m (5' 11\")   Wt 90.7 kg (200 lb)   SpO2 98%   BMI 27.89 kg/m²     Physical Exam  Constitutional:       General: He is not in acute distress.     Appearance: Normal appearance.   HENT:      Head: Normocephalic and atraumatic.      Mouth/Throat:      Mouth: Mucous membranes are moist.      Pharynx: Oropharynx is clear. No oropharyngeal exudate or posterior oropharyngeal erythema.   Eyes:      General: No scleral icterus.     Extraocular Movements: Extraocular movements intact.      Pupils: Pupils are equal, round, and reactive to light.   Cardiovascular:      Rate and Rhythm: Normal rate and regular rhythm.      Pulses: Normal pulses.      Heart sounds: No murmur heard.     No friction rub. No gallop.   Pulmonary:      Effort: Pulmonary effort is normal.      Breath " sounds: No wheezing, rhonchi or rales.   Skin:     General: Skin is warm.      Coloration: Skin is not jaundiced or pale.      Findings: No erythema or rash.   Neurological:      General: No focal deficit present.      Mental Status: He is alert and oriented to person, place, and time.      Cranial Nerves: No cranial nerve deficit.      Sensory: No sensory deficit.      Coordination: Coordination normal.      Gait: Gait normal.         Legacy Encounter on 06/07/2022   Component Date Value Ref Range Status    WBC 06/07/2022 7.0  4.4 - 11.3 x10E9/L Final    RBC 06/07/2022 4.94  4.50 - 5.90 x10E12/L Final    Hemoglobin 06/07/2022 15.1  13.5 - 17.5 g/dL Final    Hematocrit 06/07/2022 44.7  41.0 - 52.0 % Final    MCV 06/07/2022 90  80 - 100 fL Final    MCHC 06/07/2022 33.8  32.0 - 36.0 g/dL Final    Platelets 06/07/2022 300  150 - 450 x10E9/L Final    RDW 06/07/2022 11.8  11.5 - 14.5 % Final    Neutrophils % 06/07/2022 54.7  40.0 - 80.0 % Final    Immature Granulocytes %, Automated 06/07/2022 0.7  0.0 - 0.9 % Final    Comment:  Immature Granulocyte Count (IG) includes promyelocytes,    myelocytes and metamyelocytes but does not include bands.   Percent differential counts (%) should be interpreted in the   context of the absolute cell counts (cells/L).      Lymphocytes % 06/07/2022 31.4  13.0 - 44.0 % Final    Monocytes % 06/07/2022 11.0  2.0 - 10.0 % Final    Eosinophils % 06/07/2022 1.3  0.0 - 6.0 % Final    Basophils % 06/07/2022 0.9  0.0 - 2.0 % Final    Neutrophils Absolute 06/07/2022 3.84  1.20 - 7.70 x10E9/L Final    Lymphocytes Absolute 06/07/2022 2.20  1.20 - 4.80 x10E9/L Final    Monocytes Absolute 06/07/2022 0.77  0.10 - 1.00 x10E9/L Final    Eosinophils Absolute 06/07/2022 0.09  0.00 - 0.70 x10E9/L Final    Basophils Absolute 06/07/2022 0.06  0.00 - 0.10 x10E9/L Final    Glucose 06/07/2022 80  74 - 99 mg/dL Final    Sodium 06/07/2022 139  136 - 145 mmol/L Final    Potassium 06/07/2022 4.2  3.5 - 5.3 mmol/L  Final    Chloride 06/07/2022 100  98 - 107 mmol/L Final    Bicarbonate 06/07/2022 31  21 - 32 mmol/L Final    Anion Gap 06/07/2022 12  10 - 20 mmol/L Final    Urea Nitrogen 06/07/2022 16  6 - 23 mg/dL Final    Creatinine 06/07/2022 0.89  0.50 - 1.30 mg/dL Final    GFR MALE 06/07/2022 >90  >90 mL/min/1.73m2 Final    Comment:  CALCULATIONS OF ESTIMATED GFR ARE PERFORMED   USING THE 2021 CKD-EPI STUDY REFIT EQUATION   WITHOUT THE RACE VARIABLE FOR THE IDMS-TRACEABLE   CREATININE METHODS.    https://jasn.asnjournals.org/content/early/2021/09/22/ASN.9741119682      Calcium 06/07/2022 9.6  8.6 - 10.3 mg/dL Final    Albumin 06/07/2022 4.8  3.4 - 5.0 g/dL Final    Alkaline Phosphatase 06/07/2022 52  33 - 120 U/L Final    Total Protein 06/07/2022 7.5  6.4 - 8.2 g/dL Final    AST 06/07/2022 21  9 - 39 U/L Final    Total Bilirubin 06/07/2022 0.5  0.0 - 1.2 mg/dL Final    ALT (SGPT) 06/07/2022 28  10 - 52 U/L Final    Comment:  Patients treated with Sulfasalazine may generate    falsely decreased results for ALT.      Cholesterol 06/07/2022 218 (H)  0 - 199 mg/dL Final    Comment: .      AGE      DESIRABLE   BORDERLINE HIGH   HIGH     0-19 Y     0 - 169       170 - 199     >/= 200    20-24 Y     0 - 189       190 - 224     >/= 225         >24 Y     0 - 199       200 - 239     >/= 240   **All ranges are based on fasting samples. Specific   therapeutic targets will vary based on patient-specific   cardiac risk.  .   Pediatric guidelines reference:Pediatrics 2011, 128(S5).   Adult guidelines reference: NCEP ATPIII Guidelines,     TRUDY 2001, 258:2486-97  .   Venipuncture immediately after or during the    administration of Metamizole may lead to falsely   low results. Testing should be performed immediately   prior to Metamizole dosing.      HDL 06/07/2022 44.0  mg/dL Final    Comment: .      AGE      VERY LOW   LOW     NORMAL    HIGH       0-19 Y       < 35   < 40     40-45     ----    20-24 Y       ----   < 40       >45      ----      >24 Y       ----   < 40     40-60      >60  .      Cholesterol/HDL Ratio 06/07/2022 5.0   Final    Comment: REF VALUES  DESIRABLE  < 3.4  HIGH RISK  > 5.0      LDL 06/07/2022 128 (H)  0 - 99 mg/dL Final    Comment: .                           NEAR      BORD      AGE      DESIRABLE  OPTIMAL    HIGH     HIGH     VERY HIGH     0-19 Y     0 - 109     ---    110-129   >/= 130     ----    20-24 Y     0 - 119     ---    120-159   >/= 160     ----      >24 Y     0 -  99   100-129  130-159   160-189     >/=190  .      VLDL 06/07/2022 46 (H)  0 - 40 mg/dL Final    Triglycerides 06/07/2022 230 (H)  0 - 149 mg/dL Final    Comment: .      AGE      DESIRABLE   BORDERLINE HIGH   HIGH     VERY HIGH   0 D-90 D    19 - 174         ----         ----        ----  91 D- 9 Y     0 -  74        75 -  99     >/= 100      ----    10-19 Y     0 -  89        90 - 129     >/= 130      ----    20-24 Y     0 - 114       115 - 149     >/= 150      ----         >24 Y     0 - 149       150 - 199    200- 499    >/= 500  .   Venipuncture immediately after or during the    administration of Metamizole may lead to falsely   low results. Testing should be performed immediately   prior to Metamizole dosing.      Non HDL Cholesterol 06/07/2022 174  mg/dL Final    Comment:     AGE      DESIRABLE   BORDERLINE HIGH   HIGH     VERY HIGH     0-19 Y     0 - 119       120 - 144     >/= 145    >/= 160    20-24 Y     0 - 149       150 - 189     >/= 190      ----         >24 Y    30 MG/DL ABOVE LDL CHOLESTEROL GOAL  .         Assessment/Plan   Problem List Items Addressed This Visit       Chronic fatigue     Order placed for Testosterone, total and free and TSH for further evaluation.            Relevant Orders    Testosterone, total and free    Tsh With Reflex To Free T4 If Abnormal    Generalized anxiety disorder     Stable based on symptoms and exam. Continue established treatment plan.           Relevant Medications    buPROPion XL (Wellbutrin XL) 300  mg 24 hr tablet    GERD (gastroesophageal reflux disease)     Patient was given a referral to Gastroenterology to evaluate GERD for need for EGD.           Relevant Medications    omeprazole (PriLOSEC) 20 mg DR capsule    Other Relevant Orders    Referral to Gastroenterology    Healthcare maintenance     Recommend low-cholesterol diet, low-fat diet and low-salt diet.  The need for lifelong dietary compliance in order to reduce cardiac risk is recommended.  We will also recommend regular exercise program to improve lipid balance and overall health.  Recommend decreasing fat and cholesterol in diet, increasing aerobic exercise with a goal of 4 or more days per week               Scribe Attestation  By signing my name below, I, Crystal Lissette, Scribe   attest that this documentation has been prepared under the direction and in the presence of Andreas Catalan MD .

## 2024-10-05 ASSESSMENT — ENCOUNTER SYMPTOMS
EARLY SATIETY: 0
HEMATURIA: 0
FEVER: 0
FREQUENCY: 0
WHEEZING: 0
VOMITING: 0
HEADACHES: 0
HOARSE VOICE: 0
DIARRHEA: 0
HEARTBURN: 1
NAUSEA: 0
TREMORS: 0
PALPITATIONS: 0
CONSTIPATION: 0
BELCHING: 1
WATER BRASH: 1
CHILLS: 0
SHORTNESS OF BREATH: 0
RHINORRHEA: 0
CHOKING: 0
DIZZINESS: 0
DYSURIA: 0
ABDOMINAL PAIN: 0
COUGH: 0
NUMBNESS: 0
GLOBUS SENSATION: 0
SORE THROAT: 0

## 2024-10-15 ENCOUNTER — TELEPHONE (OUTPATIENT)
Dept: CARDIOLOGY | Facility: CLINIC | Age: 29
End: 2024-10-15
Payer: COMMERCIAL

## 2024-10-15 DIAGNOSIS — I10 PRIMARY HYPERTENSION: ICD-10-CM

## 2024-10-15 RX ORDER — LOSARTAN POTASSIUM 100 MG/1
100 TABLET ORAL DAILY
Qty: 30 TABLET | Refills: 0 | Status: SHIPPED | OUTPATIENT
Start: 2024-10-15 | End: 2024-11-14

## 2024-10-15 NOTE — TELEPHONE ENCOUNTER
Received request for prescription refills for patient.   Patient follows with Dr. Wolf     Request is for Losartan 100mg QD  Is patient currently on medication yes    Last OV 9/22/23  Next OV overdue- needs appointment.  Sec'y notified.    Pended short supply for signing and sent to provider

## 2024-10-15 NOTE — TELEPHONE ENCOUNTER
Recvd refill request.  Pt. Overdue for yearly follow up with Dr. Wolf(was due in September).          Tari Barraza, JEREMY; MARTÍN [address] [employer]  Caller: Unspecified (Today, 11:32 AM)  I called and pt stated that he already got a new DR.          Noted reply from sec'y that pt. Will be seeing a new physician.  Marielos

## 2024-11-08 DIAGNOSIS — I10 PRIMARY HYPERTENSION: ICD-10-CM

## 2024-11-08 RX ORDER — LOSARTAN POTASSIUM 100 MG/1
100 TABLET ORAL DAILY
Qty: 90 TABLET | Refills: 1 | Status: SHIPPED | OUTPATIENT
Start: 2024-11-08

## 2024-11-12 DIAGNOSIS — M54.50 CHRONIC BILATERAL LOW BACK PAIN WITHOUT SCIATICA: ICD-10-CM

## 2024-11-12 DIAGNOSIS — G89.29 CHRONIC BILATERAL LOW BACK PAIN WITHOUT SCIATICA: ICD-10-CM

## 2024-11-12 RX ORDER — IBUPROFEN 800 MG/1
800 TABLET ORAL DAILY PRN
Qty: 30 TABLET | Refills: 0 | Status: SHIPPED | OUTPATIENT
Start: 2024-11-12

## 2024-12-26 ENCOUNTER — APPOINTMENT (OUTPATIENT)
Dept: GASTROENTEROLOGY | Facility: CLINIC | Age: 29
End: 2024-12-26
Payer: COMMERCIAL

## 2025-05-20 DIAGNOSIS — I10 PRIMARY HYPERTENSION: ICD-10-CM

## 2025-05-20 RX ORDER — LOSARTAN POTASSIUM 100 MG/1
100 TABLET ORAL DAILY
Qty: 90 TABLET | Refills: 1 | Status: SHIPPED | OUTPATIENT
Start: 2025-05-20

## 2025-05-20 NOTE — TELEPHONE ENCOUNTER
Received request for prescription refills for patient.   Patient follows with Dr. Wolf     Request is for Cozaar  Is patient currently on medication yes    Last OV 9/22/2023  Next OV needs to schedule an appointment    Pended for signing and sent to provider

## 2025-06-06 ENCOUNTER — TELEPHONE (OUTPATIENT)
Dept: PRIMARY CARE | Facility: CLINIC | Age: 30
End: 2025-06-06
Payer: COMMERCIAL

## 2025-06-06 DIAGNOSIS — K21.9 GASTROESOPHAGEAL REFLUX DISEASE WITHOUT ESOPHAGITIS: ICD-10-CM

## 2025-06-06 RX ORDER — OMEPRAZOLE 20 MG/1
CAPSULE, DELAYED RELEASE ORAL
Qty: 90 CAPSULE | Refills: 0 | Status: SHIPPED | OUTPATIENT
Start: 2025-06-06